# Patient Record
Sex: FEMALE | Race: WHITE | NOT HISPANIC OR LATINO | Employment: UNEMPLOYED | URBAN - NONMETROPOLITAN AREA
[De-identification: names, ages, dates, MRNs, and addresses within clinical notes are randomized per-mention and may not be internally consistent; named-entity substitution may affect disease eponyms.]

---

## 2024-08-15 ENCOUNTER — OFFICE VISIT (OUTPATIENT)
Dept: CARDIOLOGY | Facility: CLINIC | Age: 34
End: 2024-08-15
Payer: COMMERCIAL

## 2024-08-15 VITALS
SYSTOLIC BLOOD PRESSURE: 112 MMHG | OXYGEN SATURATION: 97 % | BODY MASS INDEX: 25.48 KG/M2 | WEIGHT: 143.8 LBS | HEART RATE: 62 BPM | DIASTOLIC BLOOD PRESSURE: 78 MMHG | HEIGHT: 63 IN

## 2024-08-15 DIAGNOSIS — R07.2 PRECORDIAL PAIN: ICD-10-CM

## 2024-08-15 DIAGNOSIS — R06.09 DYSPNEA ON EXERTION: ICD-10-CM

## 2024-08-15 DIAGNOSIS — R55 SYNCOPE AND COLLAPSE: Primary | ICD-10-CM

## 2024-08-15 DIAGNOSIS — R00.2 PALPITATIONS: ICD-10-CM

## 2024-08-15 PROCEDURE — 99204 OFFICE O/P NEW MOD 45 MIN: CPT | Performed by: CLINICAL NURSE SPECIALIST

## 2024-08-15 RX ORDER — CITALOPRAM HYDROBROMIDE 10 MG/1
10 TABLET ORAL DAILY
COMMUNITY

## 2024-08-15 RX ORDER — BUSPIRONE HYDROCHLORIDE 10 MG/1
10 TABLET ORAL DAILY
COMMUNITY

## 2024-08-15 NOTE — PROGRESS NOTES
"Shruthi Berry is a 33 y.o. female who presents today for Syncope and Palpitations.    CHIEF COMPLIANT  Chief Complaint   Patient presents with    Syncope    Palpitations       Active Problems:  1.  Syncope  2.  Palpitations  3.  Chest pain  4.  Dyspnea  5.  Anxiety    HPI  Patient is a 33-year-old female that was referred for further cardiac evaluation due to recent episode of syncope and ongoing palpitations.  The patient states the symptoms have been going on for around a year since she was pregnant.  She states during her pregnancy her heart rate went up in the 170s.  She was also hypertensive when pregnant.  She was started on metoprolol tartrate due to elevated heart rate and has been on this for almost a year.  The patient states a couple months ago she started feeling a sensation in her chest.  She states her chest will get tight and she will feel a \"thump.\"  She states it feels like her heart is skipping beats.  She has also been having episodes of dizziness where she feels like she will blackout when she stands up.  Last Tuesday she was driving and her arm started feeling heavy and numb.  She was able to pull over on the side of the road and ended up passing out.  She was on the phone with her mother at the time this occurred who was able to get a hold of her .  He had her location and was able to send an ambulance.  The patient did have her children in the car with her.  She states she was in and out.  She did feel the palpitations during this event.  She was taken to the emergency department for evaluation.  Her labs were normal.  She was given IV fluids and her symptoms did improve.  The patient has been on semaglutide and has lost a total of 30 pounds.  The patient states she did have her shot a couple hours before the syncopal event.  She also states she took her metoprolol about an hour and a half before the syncopal event.  We did check orthostatic vital signs while in the office " and her blood pressure was negative for orthostatic hypotension.  The patient has had no further syncope since that event but has continued to have palpitations, dizziness and chest tightness.  The patient is not aware of any cardiac history in her family but does not know her father side of the family.  The patient does state she previously drank a lot of coffee but has cut this back to 1 cup/day.  She states she does not drink enough fluids.    PRIOR MEDS  Current Outpatient Medications on File Prior to Visit   Medication Sig Dispense Refill    busPIRone (BUSPAR) 10 MG tablet Take 1 tablet by mouth Daily. Pt states that she only takes when needed.      citalopram (CeleXA) 10 MG tablet Take 1 tablet by mouth Daily.      metoprolol tartrate (LOPRESSOR) 25 MG tablet Take 1 tablet by mouth 2 (Two) Times a Day.      SEMAGLUTIDE, 1 MG/DOSE, SC Inject 1 mg into the appropriate muscle as directed by prescriber 1 (One) Time Per Week.       No current facility-administered medications on file prior to visit.       ALLERGIES  Patient has no known allergies.    HISTORY  History reviewed. No pertinent past medical history.    Social History     Socioeconomic History    Marital status:    Tobacco Use    Smoking status: Never    Smokeless tobacco: Never   Vaping Use    Vaping status: Never Used   Substance and Sexual Activity    Alcohol use: Never    Drug use: Never       History reviewed. No pertinent family history.    Review of Systems   Constitutional:  Positive for chills and fatigue. Negative for fever.   HENT:  Positive for congestion, sinus pressure, sinus pain, sneezing and sore throat. Negative for ear pain.    Eyes:  Positive for pain, redness and itching.   Respiratory:  Positive for cough and wheezing. Negative for chest tightness and shortness of breath.    Cardiovascular:  Positive for leg swelling. Negative for chest pain and palpitations.   Gastrointestinal:  Negative for constipation and diarrhea.  "  Genitourinary: Negative.    Musculoskeletal:  Positive for back pain and neck stiffness. Negative for neck pain.   Allergic/Immunologic: Positive for environmental allergies and food allergies.   Neurological:  Negative for dizziness, syncope, weakness (Everyday) and light-headedness.   Psychiatric/Behavioral:  Negative for sleep disturbance (Off and on 5/6 hours a night).        Objective     VITALS: /78 (BP Location: Right arm, Patient Position: Standing)   Pulse 62   Ht 160 cm (63\")   Wt 65.2 kg (143 lb 12.8 oz)   SpO2 97%   BMI 25.47 kg/m²     LABS:   Lab Results (most recent)       None            IMAGING:   No Images in the past 120 days found..    EXAM:  Physical Exam  Constitutional:       Appearance: Normal appearance.   Eyes:      Pupils: Pupils are equal, round, and reactive to light.   Cardiovascular:      Rate and Rhythm: Normal rate and regular rhythm.      Pulses:           Carotid pulses are 2+ on the right side and 2+ on the left side.       Radial pulses are 2+ on the right side and 2+ on the left side.        Dorsalis pedis pulses are 2+ on the right side and 2+ on the left side.        Posterior tibial pulses are 2+ on the right side and 2+ on the left side.      Heart sounds: Normal heart sounds.   Pulmonary:      Effort: Pulmonary effort is normal.      Breath sounds: Normal breath sounds.   Abdominal:      General: Bowel sounds are normal.      Palpations: Abdomen is soft.   Musculoskeletal:      Right lower leg: No edema.      Left lower leg: No edema.   Skin:     General: Skin is warm and dry.      Capillary Refill: Capillary refill takes less than 2 seconds.   Neurological:      General: No focal deficit present.      Mental Status: She is alert and oriented to person, place, and time.   Psychiatric:         Mood and Affect: Mood normal.         Thought Content: Thought content normal.         Procedure   Procedures       Assessment & Plan    Diagnosis Plan   1. Syncope and " collapse  Adult Transthoracic Echo Complete W/ Cont if Necessary Per Protocol    Treadmill Stress Test    Mobile Cardiac Outpatient Telemetry    Duplex Carotid Ultrasound CAR      2. Precordial pain  Adult Transthoracic Echo Complete W/ Cont if Necessary Per Protocol    Treadmill Stress Test      3. Dyspnea on exertion  Adult Transthoracic Echo Complete W/ Cont if Necessary Per Protocol    Treadmill Stress Test      4. Palpitations  Adult Transthoracic Echo Complete W/ Cont if Necessary Per Protocol    Treadmill Stress Test    Mobile Cardiac Outpatient Telemetry        Plan:  1.  Syncope and collapse: We will schedule patient for us treadmill stress test for ischemic evaluation.  Will place a cardiac monitor to evaluate for possible dysrhythmia.  Will schedule patient for an echocardiogram to evaluate LV function and structural anatomy.  The patient's blood pressure is also on the low side today with her recent weight loss it is possible that she could have had a drop in her blood pressure.  She is having a lot of dizziness when standing up.  I have asked her to decrease her metoprolol dose to 12.5 mg p.o. twice daily to see if this makes any impact on her symptoms.  I have also asked her to increase noncaffeinated fluids to 80 to 100 ounces daily and to reduce caffeine intake.  If we are unable to determine an etiology for her syncope we may consider tilt table test.  Will discuss at her follow-up visit.  2.  Precordial pain: We will proceed with testing as described above.  3.  Dyspnea on exertion: Testing as described above.  4.  Palpitations: Will continue metoprolol but will reduce dose to 12.5 mg p.o. twice daily.  Will place the patient on a 30-day cardiac event monitor.      No follow-ups on file.    Katya Jamal  reports that she has never smoked. She has never used smokeless tobacco. I                      MEDS ORDERED DURING VISIT:  No orders of the defined types were placed in this  encounter.      DISCONTINUED MEDS DURING VISIT:   There are no discontinued medications.       This document has been electronically signed by ANAHI De Jesus  August 15, 2024 12:36 EDT    Dictated Utilizing Dragon Dictation: Part of this note may be an electronic transcription/translation of spoken language to printed text using the Dragon Dictation System

## 2024-09-17 ENCOUNTER — TELEPHONE (OUTPATIENT)
Dept: CARDIOLOGY | Facility: CLINIC | Age: 34
End: 2024-09-17
Payer: COMMERCIAL

## 2024-09-17 RX ORDER — METOPROLOL TARTRATE 37.5 MG/1
37.5 TABLET, FILM COATED ORAL 2 TIMES DAILY
Qty: 180 TABLET | Refills: 3 | Status: SHIPPED | OUTPATIENT
Start: 2024-09-17

## 2024-09-24 ENCOUNTER — HOSPITAL ENCOUNTER (OUTPATIENT)
Dept: CARDIOLOGY | Facility: HOSPITAL | Age: 34
Discharge: HOME | End: 2024-09-24
Payer: COMMERCIAL

## 2024-09-24 DIAGNOSIS — R07.2 PRECORDIAL PAIN: ICD-10-CM

## 2024-09-24 DIAGNOSIS — R06.09 DYSPNEA ON EXERTION: ICD-10-CM

## 2024-09-24 DIAGNOSIS — R55 SYNCOPE AND COLLAPSE: ICD-10-CM

## 2024-09-24 DIAGNOSIS — R00.2 PALPITATIONS: ICD-10-CM

## 2024-09-24 PROCEDURE — 93880 EXTRACRANIAL BILAT STUDY: CPT

## 2024-09-24 PROCEDURE — 93017 CV STRESS TEST TRACING ONLY: CPT

## 2024-09-24 PROCEDURE — 93306 TTE W/DOPPLER COMPLETE: CPT

## 2024-09-27 LAB
BH CV STRESS DURATION MIN STAGE 1: 3
BH CV STRESS DURATION SEC STAGE 1: 0
BH CV STRESS GRADE STAGE 1: 10
BH CV STRESS METS STAGE 1: 5
BH CV STRESS PROTOCOL 1: NORMAL
BH CV STRESS RECOVERY BP: NORMAL MMHG
BH CV STRESS RECOVERY HR: 102 BPM
BH CV STRESS SPEED STAGE 1: 1.7
BH CV STRESS STAGE 1: 1
MAXIMAL PREDICTED HEART RATE: 188 BPM
PERCENT MAX PREDICTED HR: 93.62 %
STRESS BASELINE BP: NORMAL MMHG
STRESS BASELINE HR: 71 BPM
STRESS PERCENT HR: 110 %
STRESS POST ESTIMATED WORKLOAD: 11.8 METS
STRESS POST EXERCISE DUR MIN: 9 MIN
STRESS POST EXERCISE DUR SEC: 37 SEC
STRESS POST PEAK BP: NORMAL MMHG
STRESS POST PEAK HR: 176 BPM
STRESS TARGET HR: 160 BPM

## 2024-09-29 LAB
BH CV ECHO MEAS - ACS: 2.2 CM
BH CV ECHO MEAS - AO MAX PG: 7.8 MMHG
BH CV ECHO MEAS - AO MEAN PG: 4.7 MMHG
BH CV ECHO MEAS - AO ROOT DIAM: 3.1 CM
BH CV ECHO MEAS - AO V2 MAX: 139.8 CM/SEC
BH CV ECHO MEAS - AO V2 VTI: 34.2 CM
BH CV ECHO MEAS - EDV(CUBED): 69.9 ML
BH CV ECHO MEAS - EDV(MOD-SP4): 72.3 ML
BH CV ECHO MEAS - EF(MOD-SP4): 57.1 %
BH CV ECHO MEAS - EF_3D-VOL: 61 %
BH CV ECHO MEAS - ESV(CUBED): 25.9 ML
BH CV ECHO MEAS - ESV(MOD-SP4): 31 ML
BH CV ECHO MEAS - FS: 28.2 %
BH CV ECHO MEAS - IVS/LVPW: 1.04 CM
BH CV ECHO MEAS - IVSD: 0.99 CM
BH CV ECHO MEAS - LA DIMENSION: 3.2 CM
BH CV ECHO MEAS - LAT PEAK E' VEL: 14.7 CM/SEC
BH CV ECHO MEAS - LV DIASTOLIC VOL/BSA (35-75): 43.1 CM2
BH CV ECHO MEAS - LV MASS(C)D: 127.6 GRAMS
BH CV ECHO MEAS - LV SYSTOLIC VOL/BSA (12-30): 18.5 CM2
BH CV ECHO MEAS - LVIDD: 4.1 CM
BH CV ECHO MEAS - LVIDS: 3 CM
BH CV ECHO MEAS - LVPWD: 0.95 CM
BH CV ECHO MEAS - MED PEAK E' VEL: 12.6 CM/SEC
BH CV ECHO MEAS - MV A MAX VEL: 66.4 CM/SEC
BH CV ECHO MEAS - MV DEC TIME: 0.22 SEC
BH CV ECHO MEAS - MV E MAX VEL: 91.3 CM/SEC
BH CV ECHO MEAS - MV E/A: 1.37
BH CV ECHO MEAS - RVDD: 3.1 CM
BH CV ECHO MEAS - SV(MOD-SP4): 41.3 ML
BH CV ECHO MEAS - SVI(MOD-SP4): 24.6 ML/M2
BH CV ECHO MEASUREMENTS AVERAGE E/E' RATIO: 6.69
BH CV XLRA MEAS LEFT CAROTID BULB EDV: -26.9 CM/SEC
BH CV XLRA MEAS LEFT CAROTID BULB PSV: -85.8 CM/SEC
BH CV XLRA MEAS LEFT DIST CCA EDV: -32.1 CM/SEC
BH CV XLRA MEAS LEFT DIST CCA PSV: -96.2 CM/SEC
BH CV XLRA MEAS LEFT DIST ICA EDV: -52.5 CM/SEC
BH CV XLRA MEAS LEFT DIST ICA PSV: -113 CM/SEC
BH CV XLRA MEAS LEFT ICA/CCA RATIO: 1.2
BH CV XLRA MEAS LEFT MID ICA EDV: -36.4 CM/SEC
BH CV XLRA MEAS LEFT MID ICA PSV: -87 CM/SEC
BH CV XLRA MEAS LEFT PROX CCA EDV: 45.9 CM/SEC
BH CV XLRA MEAS LEFT PROX CCA PSV: 116 CM/SEC
BH CV XLRA MEAS LEFT PROX ECA PSV: -80.6 CM/SEC
BH CV XLRA MEAS LEFT PROX ICA EDV: -36.4 CM/SEC
BH CV XLRA MEAS LEFT PROX ICA PSV: -95.8 CM/SEC
BH CV XLRA MEAS LEFT VERTEBRAL A EDV: 17.7 CM/SEC
BH CV XLRA MEAS LEFT VERTEBRAL A PSV: 55.5 CM/SEC
BH CV XLRA MEAS RIGHT CAROTID BULB EDV: -20.3 CM/SEC
BH CV XLRA MEAS RIGHT CAROTID BULB PSV: -84.8 CM/SEC
BH CV XLRA MEAS RIGHT DIST CCA EDV: -26.6 CM/SEC
BH CV XLRA MEAS RIGHT DIST CCA PSV: -105 CM/SEC
BH CV XLRA MEAS RIGHT DIST ICA EDV: -35.7 CM/SEC
BH CV XLRA MEAS RIGHT DIST ICA PSV: -73.5 CM/SEC
BH CV XLRA MEAS RIGHT ICA/CCA RATIO: 0.7
BH CV XLRA MEAS RIGHT MID ICA EDV: -30.4 CM/SEC
BH CV XLRA MEAS RIGHT MID ICA PSV: -73.3 CM/SEC
BH CV XLRA MEAS RIGHT PROX CCA EDV: 30.1 CM/SEC
BH CV XLRA MEAS RIGHT PROX CCA PSV: 118 CM/SEC
BH CV XLRA MEAS RIGHT PROX ECA PSV: -76.4 CM/SEC
BH CV XLRA MEAS RIGHT PROX ICA EDV: -23.1 CM/SEC
BH CV XLRA MEAS RIGHT PROX ICA PSV: -68.7 CM/SEC
BH CV XLRA MEAS RIGHT VERTEBRAL A EDV: 11 CM/SEC
BH CV XLRA MEAS RIGHT VERTEBRAL A PSV: 33.8 CM/SEC
LEFT ATRIUM VOLUME INDEX: 17.9 ML/M2

## 2024-09-30 ENCOUNTER — TELEPHONE (OUTPATIENT)
Dept: CARDIOLOGY | Facility: CLINIC | Age: 34
End: 2024-09-30
Payer: COMMERCIAL

## 2024-09-30 NOTE — TELEPHONE ENCOUNTER
STRESS/ECHO/US CAROTID  Pt notified of no acute findings. Provider will discuss results at f/u. Pt reminded of appt date and time.  ----- Message from Padmini PATEL sent at 9/30/2024  8:40 AM EDT -----    ----- Message -----  From: Margarita Lozano APRN  Sent: 9/30/2024   8:07 AM EDT  To: Mari Chino    EF 55-60%.  Keep follow up

## 2024-10-02 ENCOUNTER — OFFICE VISIT (OUTPATIENT)
Dept: CARDIOLOGY | Facility: CLINIC | Age: 34
End: 2024-10-02
Payer: COMMERCIAL

## 2024-10-02 VITALS
OXYGEN SATURATION: 96 % | DIASTOLIC BLOOD PRESSURE: 67 MMHG | BODY MASS INDEX: 25.48 KG/M2 | HEART RATE: 76 BPM | SYSTOLIC BLOOD PRESSURE: 103 MMHG | WEIGHT: 143.8 LBS | HEIGHT: 63 IN

## 2024-10-02 DIAGNOSIS — I65.22 LEFT CAROTID STENOSIS: Primary | ICD-10-CM

## 2024-10-02 DIAGNOSIS — R55 SYNCOPE AND COLLAPSE: ICD-10-CM

## 2024-10-02 DIAGNOSIS — R00.2 PALPITATIONS: ICD-10-CM

## 2024-10-02 DIAGNOSIS — I49.3 FREQUENT PVCS: Primary | ICD-10-CM

## 2024-10-02 PROCEDURE — 99214 OFFICE O/P EST MOD 30 MIN: CPT | Performed by: CLINICAL NURSE SPECIALIST

## 2024-10-02 RX ORDER — METOPROLOL TARTRATE 25 MG/1
25 TABLET, FILM COATED ORAL 2 TIMES DAILY
Qty: 60 TABLET | Refills: 11 | Status: SHIPPED | OUTPATIENT
Start: 2024-10-02

## 2024-10-02 RX ORDER — FLECAINIDE ACETATE 50 MG/1
50 TABLET ORAL 2 TIMES DAILY
Qty: 60 TABLET | Refills: 11 | Status: SHIPPED | OUTPATIENT
Start: 2024-10-02

## 2024-10-02 NOTE — PROGRESS NOTES
Subjective     Katya Berry is a 34 y.o. female who presents today for Follow-up (3mth).    CHIEF COMPLIANT  Chief Complaint   Patient presents with    Follow-up     3mth       Active Problems:  1.  Syncope  1.1 carotid Doppler 9/24/2024: No evidence of hemodynamically significant stenosis in either carotid system.  Antegrade flow in both vertebrals.16-49% left, less than 15% right   2.  Palpitations  2.1 cardiac monitor 8/15/2024: Comment underlying rhythm is normal sinus rhythm.  Episodes sinus tachycardia with rate up to 1:48 PM.  PVCs 5% burden.  Patient triggered symptoms of flutter/skipped beats associated with PVCs.  Trigeminy noted.  3.  Chest pain  3.1 treadmill stress test 9/24/2024: No EKG evidence of ischemia, no exercise-induced dysrhythmia.  4.  Dyspnea  4.1 echocardiogram 9/24/2024: EF 55 to 60%.  Normal LV diastolic function.  No significant valvular abnormalities.  5.  Anxiety       HPI  The patient is a 34-year-old female that returns for follow-up discuss recent testing.  The patient had a treadmill stress test on 9/24/2024 that showed no EKG evidence of ischemia and no exercise-induced dysrhythmia.  Echocardiogram showed an EF of 55 to 60%, normal LV diastolic function and no significant valvular abnormalities.  Carotid Doppler showed 16 to 49% stenosis on the left and less than 15 on the right.  The patient wore a cardiac monitor which did show a 5% PVC burden.  The patient did trigger symptoms of flutter/skipped beats/lightheadedness which were associated with PVCs.  There was trigeminy noted.  Since the patient's last visit she has continued to have palpitations, lightheadedness and near syncope.  She denies no further syncope since her last visit.    PRIOR MEDS  Current Outpatient Medications on File Prior to Visit   Medication Sig Dispense Refill    busPIRone (BUSPAR) 10 MG tablet Take 1 tablet by mouth Every Night.      citalopram (CeleXA) 10 MG tablet Take 1 tablet by mouth Daily.       "SEMAGLUTIDE, 1 MG/DOSE, SC Inject 1 mg into the appropriate muscle as directed by prescriber 1 (One) Time Per Week.      [DISCONTINUED] metoprolol tartrate 37.5 MG tablet Take 37.5 mg by mouth 2 (Two) Times a Day. 180 tablet 3     No current facility-administered medications on file prior to visit.       ALLERGIES  Patient has no known allergies.    HISTORY  History reviewed. No pertinent past medical history.    Social History     Socioeconomic History    Marital status:    Tobacco Use    Smoking status: Never    Smokeless tobacco: Never   Vaping Use    Vaping status: Never Used   Substance and Sexual Activity    Alcohol use: Never    Drug use: Never       History reviewed. No pertinent family history.    Review of Systems   HENT:  Negative for congestion, rhinorrhea and sore throat.    Respiratory:  Positive for shortness of breath. Negative for chest tightness.    Cardiovascular:  Positive for chest pain (Unchanged) and palpitations. Negative for leg swelling.   Gastrointestinal:  Positive for blood in stool (Clots from hemorrhoids) and constipation.   Genitourinary: Negative.    Neurological:  Positive for dizziness (Daily, usually upon standing) and headaches. Negative for syncope (None since last seen), weakness and numbness.   Hematological:  Does not bruise/bleed easily.   Psychiatric/Behavioral:  Positive for sleep disturbance.        Objective     VITALS: /67   Pulse 76   Ht 160 cm (63\")   Wt 65.2 kg (143 lb 12.8 oz)   SpO2 96%   BMI 25.47 kg/m²     LABS:   Lab Results (most recent)       None            IMAGING:   No Images in the past 120 days found..    EXAM:    Constitutional:       Appearance: Normal appearance.   Eyes:      Pupils: Pupils are equal, round, and reactive to light.   Cardiovascular:      Rate and Rhythm: Normal rate and regular rhythm.      Pulses:           Carotid pulses are 2+ on the right side and 2+ on the left side.       Radial pulses are 2+ on the right side " and 2+ on the left side.        Dorsalis pedis pulses are 2+ on the right side and 2+ on the left side.        Posterior tibial pulses are 2+ on the right side and 2+ on the left side.      Heart sounds: Normal heart sounds.   Pulmonary:      Effort: Pulmonary effort is normal.      Breath sounds: Normal breath sounds.   Abdominal:      General: Bowel sounds are normal.      Palpations: Abdomen is soft.   Musculoskeletal:      Right lower leg: No edema.      Left lower leg: No edema.   Skin:     General: Skin is warm and dry.      Capillary Refill: Capillary refill takes less than 2 seconds.   Neurological:      General: No focal deficit present.      Mental Status: She is alert and oriented to person, place, and time.   Psychiatric:         Mood and Affect: Mood normal.         Thought Content: Thought content normal.      Procedure   Procedures       Assessment & Plan    Diagnosis Plan   1. Frequent PVCs  metoprolol tartrate (LOPRESSOR) 25 MG tablet    flecainide (TAMBOCOR) 50 MG tablet      2. Palpitations        3. Syncope and collapse          Plan:  1.  Frequent PVCs: Monitor did show a PVC burden of 5%.  Patient's symptoms while wearing monitor did correlate with PVCs.  There were episodes of trigeminy.  Will continue metoprolol tartrate 25 mg p.o. twice daily.  Will start the patient on flecainide 50 mg twice daily.  I have asked the patient to start this medication on Friday and to come in Monday morning for an EKG.  2.  Palpitations: Plan as above.  3.  Syncope: The patient has had no further syncope.  She does continue to have dizziness/near syncope which does occur with palpitations and is also occurring with orthostatic changes.  I did encourage the patient to continue with increased fluid intake.  Will get her started on flecainide as discussed above and see how this impacts her symptoms.    Return in about 6 weeks (around 11/13/2024) for Nurse visit for EKG on Monday morning .    Katya Berry  reports  that she has never smoked. She has never used smokeless tobacco.       BMI is >= 25 and <30. (Overweight) The following options were offered after discussion;: referral to primary care           MEDS ORDERED DURING VISIT:  New Medications Ordered This Visit   Medications    metoprolol tartrate (LOPRESSOR) 25 MG tablet     Sig: Take 1 tablet by mouth 2 (Two) Times a Day.     Dispense:  60 tablet     Refill:  11    flecainide (TAMBOCOR) 50 MG tablet     Sig: Take 1 tablet by mouth 2 (Two) Times a Day.     Dispense:  60 tablet     Refill:  11       DISCONTINUED MEDS DURING VISIT:   Medications Discontinued During This Encounter   Medication Reason    metoprolol tartrate 37.5 MG tablet           This document has been electronically signed by ANAHI De Jesus  October 2, 2024 12:26 EDT    Dictated Utilizing Dragon Dictation: Part of this note may be an electronic transcription/translation of spoken language to printed text using the Dragon Dictation System

## 2024-10-07 ENCOUNTER — LAB (OUTPATIENT)
Dept: LAB | Facility: HOSPITAL | Age: 34
End: 2024-10-07
Payer: COMMERCIAL

## 2024-10-07 ENCOUNTER — CLINICAL SUPPORT (OUTPATIENT)
Dept: CARDIOLOGY | Facility: CLINIC | Age: 34
End: 2024-10-07
Payer: COMMERCIAL

## 2024-10-07 VITALS — OXYGEN SATURATION: 99 % | SYSTOLIC BLOOD PRESSURE: 108 MMHG | DIASTOLIC BLOOD PRESSURE: 73 MMHG | HEART RATE: 70 BPM

## 2024-10-07 DIAGNOSIS — I49.3 FREQUENT PVCS: Primary | ICD-10-CM

## 2024-10-07 DIAGNOSIS — I65.22 LEFT CAROTID STENOSIS: ICD-10-CM

## 2024-10-07 LAB
CHOLEST SERPL-MCNC: 151 MG/DL (ref 0–200)
HDLC SERPL-MCNC: 58 MG/DL (ref 40–60)
LDLC SERPL CALC-MCNC: 80 MG/DL (ref 0–100)
LDLC/HDLC SERPL: 1.39 {RATIO}
TRIGL SERPL-MCNC: 62 MG/DL (ref 0–150)
VLDLC SERPL-MCNC: 13 MG/DL (ref 5–40)

## 2024-10-07 PROCEDURE — 80061 LIPID PANEL: CPT

## 2024-10-07 PROCEDURE — 36415 COLL VENOUS BLD VENIPUNCTURE: CPT

## 2024-10-07 PROCEDURE — 93000 ELECTROCARDIOGRAM COMPLETE: CPT | Performed by: CLINICAL NURSE SPECIALIST

## 2024-10-07 NOTE — PROGRESS NOTES
Katya Martenn  1990  10/7/2024   ?   Chief Complaint   Patient presents with    Nurse Visit      Cardiac management for Frequent PVCs , patient was started on Flecainide 50 mg Bid      ?   HPI:   ?   ? patient is in the office today for a NV with EKG , she was started on Flecainide 50 mg BID to help with her PVCs, she states today she has not taken any medications this morning due to having fasting lab work done. But since being on the Flecainide she has noticed a decrease in PVCs , she states no issues such as headache , upset stomach or dizziness. She stated she is still taking the metoprolol as prescribed.    ?     Current Outpatient Medications:     busPIRone (BUSPAR) 10 MG tablet, Take 1 tablet by mouth Every Night., Disp: , Rfl:     citalopram (CeleXA) 10 MG tablet, Take 1 tablet by mouth Daily., Disp: , Rfl:     flecainide (TAMBOCOR) 50 MG tablet, Take 1 tablet by mouth 2 (Two) Times a Day., Disp: 60 tablet, Rfl: 11    metoprolol tartrate (LOPRESSOR) 25 MG tablet, Take 1 tablet by mouth 2 (Two) Times a Day., Disp: 60 tablet, Rfl: 11    SEMAGLUTIDE, 1 MG/DOSE, SC, Inject 1 mg into the appropriate muscle as directed by prescriber 1 (One) Time Per Week., Disp: , Rfl:    ?   ?   Patient has no known allergies.         ECG 12 Lead    Date/Time: 10/7/2024 8:47 AM  Performed by: Margarita Lozano APRN    Authorized by: Margarita Lozano APRN  Comparison: compared with previous ECG from 8/6/2024           ?   Assessment & Plan    ?   Frequent PVCs      EKG. Chart, Medication reviewed by Margarita Lozano NP     EKG acceptable today.  Patient is to continue with current medication plan.  Patient to keep routine follow up.  Patient to call with any concerns or questions.    Patient verbalized understanding   AT - [facility]   ?

## 2024-10-11 ENCOUNTER — TELEPHONE (OUTPATIENT)
Dept: CARDIOLOGY | Facility: CLINIC | Age: 34
End: 2024-10-11
Payer: COMMERCIAL

## 2024-10-11 RX ORDER — ASPIRIN 81 MG/1
81 TABLET ORAL DAILY
Qty: 90 TABLET | Refills: 3 | Status: SHIPPED | OUTPATIENT
Start: 2024-10-11

## 2024-10-11 NOTE — TELEPHONE ENCOUNTER
"Called pt and informed her of message, she asked why she has \"build up\" if labs were normal. I explained that sometimes family history can play a role in that and that ASA 81mg daily is recommended, she verbalized understanding.   "

## 2024-10-11 NOTE — TELEPHONE ENCOUNTER
----- Message from Margarita Lozano sent at 10/9/2024  6:19 PM EDT -----  Yes  ----- Message -----  From: Lucía John MA  Sent: 10/8/2024  12:41 PM EDT  To: ANAHI Bullock    Do you still want her to continue with the baby aspirin daily ?  ----- Message -----  From: Padmini William RegSched Rep  Sent: 10/8/2024  11:58 AM EDT  To: Lucía John MA      ----- Message -----  From: Margarita Lozano APRN  Sent: 10/7/2024   5:03 PM EDT  To: Mari Chino    Overall cholesterol 151, Triglycerides 62.  HDL 58, LDL 80.

## 2024-11-19 ENCOUNTER — OFFICE VISIT (OUTPATIENT)
Dept: CARDIOLOGY | Facility: CLINIC | Age: 34
End: 2024-11-19
Payer: COMMERCIAL

## 2024-11-19 VITALS
DIASTOLIC BLOOD PRESSURE: 64 MMHG | SYSTOLIC BLOOD PRESSURE: 99 MMHG | HEIGHT: 63 IN | WEIGHT: 142.6 LBS | HEART RATE: 88 BPM | OXYGEN SATURATION: 96 % | BODY MASS INDEX: 25.27 KG/M2

## 2024-11-19 DIAGNOSIS — I95.1 AUTONOMIC ORTHOSTATIC HYPOTENSION: ICD-10-CM

## 2024-11-19 DIAGNOSIS — R00.2 PALPITATIONS: ICD-10-CM

## 2024-11-19 DIAGNOSIS — I49.3 FREQUENT PVCS: Primary | ICD-10-CM

## 2024-11-19 DIAGNOSIS — R55 SYNCOPE AND COLLAPSE: ICD-10-CM

## 2024-11-19 PROCEDURE — 99214 OFFICE O/P EST MOD 30 MIN: CPT | Performed by: CLINICAL NURSE SPECIALIST

## 2024-11-19 RX ORDER — MIDODRINE HYDROCHLORIDE 2.5 MG/1
2.5 TABLET ORAL
Qty: 90 TABLET | Refills: 3 | Status: SHIPPED | OUTPATIENT
Start: 2024-11-19

## 2024-11-19 NOTE — PROGRESS NOTES
Subjective     Katya Berry is a 34 y.o. female who presents today for Follow-up (6wk).    CHIEF COMPLIANT  Chief Complaint   Patient presents with    Follow-up     6wk       Active Problems:  1.  Syncope  1.1 carotid Doppler 9/24/2024: No evidence of hemodynamically significant stenosis in either carotid system.  Antegrade flow in both vertebrals.16-49% left, less than 15% right   2.  Palpitations  2.1 cardiac monitor 8/15/2024: Comment underlying rhythm is normal sinus rhythm.  Episodes sinus tachycardia with rate up to 1:48 PM.  PVCs 5% burden.  Patient triggered symptoms of flutter/skipped beats associated with PVCs.  Trigeminy noted.  3.  Chest pain  3.1 treadmill stress test 9/24/2024: No EKG evidence of ischemia, no exercise-induced dysrhythmia.  4.  Dyspnea  4.1 echocardiogram 9/24/2024: EF 55 to 60%.  Normal LV diastolic function.  No significant valvular abnormalities.  5.  Anxiety    HPI  The patient is a 34-year-old female that returns for follow-up.  At her last visit she was started on flecainide along with metoprolol due to ongoing palpitation symptoms.  Cardiac monitor had showed around a 5% PVC burden and her symptoms did correlate with PVCs.  Since starting flecainide the patient states that her symptoms have nearly resolved.  Overall she is feeling much better from a palpitation standpoint.  She is still having significant orthostatic dizziness when she stands up and her blood pressure still remains low.  This is causing her a lot of fatigue.  She denies chest pain or dyspnea.  The only other complaint she has is that she feels she has had increased hair loss since she has been placed on flecainide.    PRIOR MEDS  Current Outpatient Medications on File Prior to Visit   Medication Sig Dispense Refill    aspirin 81 MG EC tablet Take 1 tablet by mouth Daily. 90 tablet 3    busPIRone (BUSPAR) 10 MG tablet Take 1 tablet by mouth Every Night.      citalopram (CeleXA) 10 MG tablet Take 1 tablet by mouth  "Daily.      flecainide (TAMBOCOR) 50 MG tablet Take 1 tablet by mouth 2 (Two) Times a Day. 60 tablet 11    metoprolol tartrate (LOPRESSOR) 25 MG tablet Take 1 tablet by mouth 2 (Two) Times a Day. 60 tablet 11    SEMAGLUTIDE, 1 MG/DOSE, SC Inject 1 mg into the appropriate muscle as directed by prescriber 1 (One) Time Per Week.       No current facility-administered medications on file prior to visit.       ALLERGIES  Patient has no known allergies.    HISTORY  History reviewed. No pertinent past medical history.    Social History     Socioeconomic History    Marital status:    Tobacco Use    Smoking status: Never    Smokeless tobacco: Never   Vaping Use    Vaping status: Never Used   Substance and Sexual Activity    Alcohol use: Never    Drug use: Never       History reviewed. No pertinent family history.    Review of Systems   HENT:  Positive for dental problem (Been on amoxicillin).    Respiratory:  Positive for shortness of breath. Negative for chest tightness.    Cardiovascular:  Negative for chest pain, palpitations (None, unless she forgets her medicine) and leg swelling.   Neurological:  Positive for dizziness (w/ position change). Negative for syncope, weakness, numbness and headaches.   Hematological:  Does not bruise/bleed easily.   Psychiatric/Behavioral:  Positive for sleep disturbance (Denies soB at HS, just states she's not a good sleeper).        Objective     VITALS: BP 99/64   Pulse 88   Ht 160 cm (63\")   Wt 64.7 kg (142 lb 9.6 oz)   SpO2 96%   BMI 25.26 kg/m²     LABS:   Lab Results (most recent)       None            IMAGING:   No Images in the past 120 days found..    EXAM:  Constitutional:       Appearance: Normal appearance.   Eyes:      Pupils: Pupils are equal, round, and reactive to light.   Cardiovascular:      Rate and Rhythm: Normal rate and regular rhythm.      Pulses:           Carotid pulses are 2+ on the right side and 2+ on the left side.       Radial pulses are 2+ on the " right side and 2+ on the left side.        Dorsalis pedis pulses are 2+ on the right side and 2+ on the left side.        Posterior tibial pulses are 2+ on the right side and 2+ on the left side.      Heart sounds: Normal heart sounds.   Pulmonary:      Effort: Pulmonary effort is normal.      Breath sounds: Normal breath sounds.   Abdominal:      General: Bowel sounds are normal.      Palpations: Abdomen is soft.   Musculoskeletal:      Right lower leg: No edema.      Left lower leg: No edema.   Skin:     General: Skin is warm and dry.      Capillary Refill: Capillary refill takes less than 2 seconds.   Neurological:      General: No focal deficit present.      Mental Status: She is alert and oriented to person, place, and time.   Psychiatric:         Mood and Affect: Mood normal.         Thought Content: Thought content normal.   Procedure   Procedures       Assessment & Plan    Diagnosis Plan   1. Frequent PVCs        2. Palpitations        3. Autonomic orthostatic hypotension  midodrine (PROAMATINE) 2.5 MG tablet      4. Syncope and collapse          Plan:  1.  Frequent PVCs: Will continue flecainide and beta-blocker.  Since starting these medications the patient's palpitation symptoms have nearly resolved.  She is concerned about hair loss since starting flecainide.  We did discuss if this continued we could potentially try a different medication but the patient states she feels so much better from a cardiac standpoint that she does not want to change medications at this time.  She was advised to let us know if palpitation symptoms return or worsen.  2.  Palpitations: Plan as above.  3.  Orthostatic hypotension: Will add midodrine 2.5 mg p.o. 3 times daily due to ongoing hypotension.  Will see the patient back to evaluate effectiveness.  4.  Syncope: The patient has had no further syncope.  She does continue to have dizziness/near syncope.  Will add midodrine as discussed above.  I did encourage the patient to  continue with increased fluid intake.       Return in about 2 months (around 1/19/2025).    Katya Berry  reports that she has never smoked. She has never used smokeless tobacco.             MEDS ORDERED DURING VISIT:  New Medications Ordered This Visit   Medications    midodrine (PROAMATINE) 2.5 MG tablet     Sig: Take 1 tablet by mouth 3 (Three) Times a Day Before Meals.     Dispense:  90 tablet     Refill:  3       DISCONTINUED MEDS DURING VISIT:   There are no discontinued medications.       This document has been electronically signed by ANAHI De Jesus  November 20, 2024 07:53 EST    Dictated Utilizing Dragon Dictation: Part of this note may be an electronic transcription/translation of spoken language to printed text using the Dragon Dictation System

## 2024-12-03 ENCOUNTER — TELEPHONE (OUTPATIENT)
Dept: CARDIOLOGY | Facility: CLINIC | Age: 34
End: 2024-12-03
Payer: COMMERCIAL

## 2024-12-03 DIAGNOSIS — I49.3 FREQUENT PVCS: ICD-10-CM

## 2024-12-03 NOTE — TELEPHONE ENCOUNTER
"After Judaism Sunday morning, pt reported since starting the Flecainide she's experiencing an immense amount of hair loss.  Stated it is \"clumps\" of hair and she is concerned and has an appt with dermatology in February.  Stated she is afraid she will be bald by then and inquired if this is a common side effect with this medication.  She stated she had her thyroid checked and it is normal.    Discussed with Dr. Ayala why addressing a couple other things with him.  He researched it and made the recommendations below.    Pt to D/C Flecainide, after 5 days (12/9/24 am) start Sotalol 40 mg BID, EKG after 5th dose, and reduce Metoprolol to 12.5 mg BID once she starts Sotalol.    Pt notified of these recommendation and verbalized an understanding.  Verified pharmacy.    "

## 2024-12-04 RX ORDER — METOPROLOL TARTRATE 25 MG/1
25 TABLET, FILM COATED ORAL 2 TIMES DAILY
Qty: 60 TABLET | Refills: 11 | Status: SHIPPED | OUTPATIENT
Start: 2024-12-04

## 2024-12-11 ENCOUNTER — CLINICAL SUPPORT (OUTPATIENT)
Dept: CARDIOLOGY | Facility: CLINIC | Age: 34
End: 2024-12-11
Payer: COMMERCIAL

## 2024-12-11 VITALS — HEART RATE: 60 BPM | SYSTOLIC BLOOD PRESSURE: 106 MMHG | DIASTOLIC BLOOD PRESSURE: 66 MMHG

## 2024-12-11 DIAGNOSIS — R00.2 PALPITATIONS: ICD-10-CM

## 2024-12-11 DIAGNOSIS — I49.3 FREQUENT PVCS: Primary | ICD-10-CM

## 2024-12-11 NOTE — PROGRESS NOTES
Katya Jamal  1990 12/11/2024   ?   Chief Complaint   Patient presents with    Palpitations     EKG, Sotalol therapy      ?   HPI:   ?   ? Patient presents as nurse visit for EKG after starting Sotalol 40 mg BID on Monday. Flecainide was discontinued 5 days earlier due to hair loss. She does not feel the Sotalol is working as well as Flecainide but better than it was without it. EKG done as ordered and to be reviewed by Margarita CHRISTIAN   ?     Current Outpatient Medications:     aspirin 81 MG EC tablet, Take 1 tablet by mouth Daily., Disp: 90 tablet, Rfl: 3    busPIRone (BUSPAR) 10 MG tablet, Take 1 tablet by mouth Every Night., Disp: , Rfl:     citalopram (CeleXA) 10 MG tablet, Take 1 tablet by mouth Daily., Disp: , Rfl:     metoprolol tartrate (LOPRESSOR) 25 MG tablet, Take 1 tablet by mouth 2 (Two) Times a Day. Pt to take 1/2 BID once she starts Sotalol, Disp: 60 tablet, Rfl: 11    midodrine (PROAMATINE) 2.5 MG tablet, Take 1 tablet by mouth 3 (Three) Times a Day Before Meals., Disp: 90 tablet, Rfl: 3    SEMAGLUTIDE, 1 MG/DOSE, SC, Inject 1 mg into the appropriate muscle as directed by prescriber 1 (One) Time Per Week., Disp: , Rfl:     Sotalol HCl AF 80 MG tablet, Take 0.5 tablets by mouth 2 (Two) Times a Day., Disp: 30 tablet, Rfl: 1   ?   ?   Patient has no known allergies.         ECG 12 Lead    Date/Time: 12/11/2024 10:14 AM  Performed by: Margarita Lozano APRN    Authorized by: Margarita Lozano APRN  Comparison: compared with previous ECG from 10/7/2024           ?   Assessment & Plan    ?   ?   ?  1. Palpitations    EKG, vitals and symptoms reviewed by Margarita CHRISTIAN with order to increase Sotalol to 80 mg BID, EKG in 3 days. She will start increase today with EKG Friday and call with any concerns.

## 2024-12-13 ENCOUNTER — CLINICAL SUPPORT (OUTPATIENT)
Dept: CARDIOLOGY | Facility: CLINIC | Age: 34
End: 2024-12-13
Payer: COMMERCIAL

## 2024-12-13 VITALS
OXYGEN SATURATION: 96 % | HEART RATE: 56 BPM | WEIGHT: 151.6 LBS | BODY MASS INDEX: 26.86 KG/M2 | SYSTOLIC BLOOD PRESSURE: 98 MMHG | DIASTOLIC BLOOD PRESSURE: 48 MMHG | HEIGHT: 63 IN

## 2024-12-13 DIAGNOSIS — R00.2 PALPITATIONS: Primary | ICD-10-CM

## 2024-12-13 NOTE — PROGRESS NOTES
GI CONSULT NOTE    REQUESTING PROVIDER:  Andrew Kunz MD    CHIEF COMPLAINT:  Abdominal Pain (All over )       SUBJECTIVE:    Lor Perales is a 62 year old female seen today at the request of Andrew Kunz MD for abdominal pain. She was last seen in 2017. She has lower abdominal pain-bilateral, ongoing for the past 3 years.  No change in nature of the pain or severity in the past three years. It is intermittent, worse after eating, better with defecation. Some constipation-about 3 days/week. Stool is hard, + straining. Takes a chocolate flavored laxative which helps.    Imaging-x ray with moderate stool in her colon    She takes omeprazole 40 mg po daily-helps epigastric pain (that she had at the time of her last visit)    Says she has seen gyne and was told that her symptoms were not gynecologic in nature    PMH includes DM, h/o DVT     Colonoscopy 2017 3 mm hyperplastic polyp, hemorrhoids, normal TI  EGD 2017-h pylori neg, duodenal bx unremarkable    She speaks Spanish      PROBLEM LIST:                  Patient Active Problem List   Diagnosis   • Abdominal pain   • Multiple thyroid nodules   • Allergic rhinitis   • Anemia   • Arthralgia of multiple sites   • Chronic cough   • GERD (gastroesophageal reflux disease)   • Hyperlipidemia   • Renal mass   • Oral herpes   • Type 2 diabetes mellitus (CMS/HCC)   • Dyspepsia   • Irritable bowel syndrome with constipation       HISTORIES:    ALLERGIES:  No Known Allergies  Past Medical History:   Diagnosis Date   • Abdominal pain    • Bilateral shoulder pain    • Colon cancer screening    • Cough    • Deep venous thrombosis (CMS/HCC)    • Headache    • Hypotension    • Neck pain    • Otitis externa    • Spontaneous ecchymoses    • Vitamin B12 deficiency      Past Surgical History:   Procedure Laterality Date   • Colonoscopy     • Esophagogastroduodenoscopy     • Upper gastrointestinal endoscopy       Social History     Tobacco Use   • Smoking status: Never Smoker  Katya Martenn  1990 12/13/2024   ?   No chief complaint on file.     ?   HPI:   ?   Patient presents for EKG post Sotalol increase to 80 mg twice daily on 12/11/24. Patient feels palpitations may have improved some. She feels palpitations more at night, she has also had some migraines. Patient has not taken any midodrine yet today. She took TWO doses yesterday. She is concerned with having to take with food. EKG done as ordered.    Current Outpatient Medications:     aspirin 81 MG EC tablet, Take 1 tablet by mouth Daily., Disp: 90 tablet, Rfl: 3    busPIRone (BUSPAR) 10 MG tablet, Take 1 tablet by mouth Every Night., Disp: , Rfl:     citalopram (CeleXA) 10 MG tablet, Take 1 tablet by mouth Daily., Disp: , Rfl:     metoprolol tartrate (LOPRESSOR) 25 MG tablet, Take 1 tablet by mouth 2 (Two) Times a Day. Pt to take 1/2 BID once she starts Sotalol, Disp: 60 tablet, Rfl: 11    midodrine (PROAMATINE) 2.5 MG tablet, Take 1 tablet by mouth 3 (Three) Times a Day Before Meals. (Patient not taking: Reported on 12/11/2024), Disp: 90 tablet, Rfl: 3    SEMAGLUTIDE, 1 MG/DOSE, SC, Inject 1 mg into the appropriate muscle as directed by prescriber 1 (One) Time Per Week., Disp: , Rfl:     Sotalol HCl AF 80 MG tablet, Take 0.5 tablets by mouth 2 (Two) Times a Day., Disp: 30 tablet, Rfl: 1   ?   ?   Patient has no known allergies.         ECG 12 Lead    Date/Time: 12/13/2024 11:32 AM  Performed by: Margarita Lozano APRN    Authorized by: Margarita Lozano APRN  Comparison: compared with previous ECG from 12/11/2024         ?   Assessment & Plan    ?   ? 1. Palpitations    EKG and vitals reviewed by Margarita Lozano, no new orders at this time. Patient aware to take midodrine without food and see how she reacts to it. She will call with any concerns and keep her follow up in January.  ?          • Smokeless tobacco: Never Used   Substance Use Topics   • Alcohol use: No     Frequency: Never     Drug Use:    Never            No family history on file.     MEDICATIONS:       Medications    Medication Directions   Blood Glucose Monitoring Suppl (ONE TOUCH ULTRA 2) w/Device Kit USE AS DIRECTED   metFORMIN (GLUCOPHAGE) 500 MG tablet 1 TAKE 1 TABLET BY MOUTH TWICE DAILY   atorvastatin (LIPITOR) 80 MG tablet 1 TAKE 1 TABLET BY MOUTH EVERY DAY   meloxicam (MOBIC) 15 MG tablet 1 TAKE 1 TABLET BY MOUTH EVERY DAY   ONE TOUCH ULTRA TEST test strip TEST THREE TIMES DAILY   MICROLET LANCETS Misc TEST THREE TIMES DAILY   omeprazole (PRILOSEC) 40 MG capsule Take 1 capsule by mouth daily.   Alcohol Swabs (ALCOHOL PADS) 70 % Pads TEST THREE TIMES DAILY   ferrous sulfate 325 (65 FE) MG tablet TAKE 1 TABLET DAILY   triamcinolone (KENALOG) 0.1 % dental paste Apply three times daily for ten days.   diphenhyd-lidocaine-nystatin (FIRST-BXN MOUTHWASH) suspension Swish and spit 1 tsp every three hours as needed.       REVIEW OF SYSTEMS:    All other systems are reviewed and are negative except as documented in the history of present illness.    PHYSICAL EXAM:    Vital Signs: Blood pressure 110/70, pulse 65, temperature 97.9 °F (36.6 °C), weight 68.5 kg (151 lb).  General: The patient is well developed, well nourished, in no acute distress, appears stated age.   Neurologic: Alert. Normal mood and affect, normal speech, no gross tremor.  Skin: Warm and dry, normal turgor.  Head: Normocephalic.  Eyes: Normal conjunctivae and sclerae.  Pupils equal, round, reactive to light.  Extraocular movements intact.  HEENT: Oropharynx clear, moist mucous membranes, external nose is normal to inspection.  Neck: Symmetric without swelling or tenderness.   Respiratory: Respiratory effort normal.   Cardiovascular: Regular rate and rhythm.  Extremities: No swelling or tenderness.  Gastrointestinal:  Soft and nontender.  Normal bowel sounds.  No  hepatomegaly or splenomegaly.       LABORATORY DATA:   Lab Services on 08/29/2019   Component Date Value Ref Range Status   • Specimen Description 08/29/2019 STOOL STOOL   Final   • CULTURE 08/29/2019 NEGATIVE FOR CAMPYLOBACTER BY EIA.   Final   • CULTURE 08/29/2019 NEGATIVE FOR SALMONELLA,SHIGELLA,AEROMONAS,AND PLESIOMONAS   Final   • CULTURE 08/29/2019 NEGATIVE FOR SHIGA TOXIN BY EIA.   Final   • REPORT STATUS 08/29/2019 08/31/2019 FINAL   Final   • Fecal Occult Bld Immunochem 08/29/2019 NEGATIVE  NEGATIVE Final   • STOOL FOR WBC'S 08/29/2019 NEGATIVE  NEGATIVE Final   • Specimen Description 08/29/2019 BOWEL CONTENTS STOOL   Final   • OVA/PARASITES 08/29/2019 NO OVA OR PARASITES DETECTED. (CRYPTOSPORIDIA, MICROSPORIDIA AND CYCLOSPORA NOT ROUTINELY TESTED FOR.)   Final   • REPORT STATUS 08/29/2019 08/30/2019 FINAL   Final   • C. DIFFICILE SOURCE 08/29/2019 STOOL   Final   • C. difficile Toxin PCR 08/29/2019 NOT DETECTED  NOT DETECTED Final    Comment: C. DIFFICILE TOXIN EIA TESTING NOT INDICATED. C. difficile infection is highly unlikely.  Submission of additional specimens within 7 days is not recommended  C. Difficile Toxin B Gene not detected by Nucleic Acid Amplification.     A single negative test for C. difficile means the likelihood that this organism is causing the diarrheal illness is extremely low. Therefore repeat testing is strongly discouraged. If a new diarrheal illness occurs more than 7 days after the prior   negative test, then sending a SINGLE repeat specimen may be indicated.     Office Visit on 08/14/2019   Component Date Value Ref Range Status   • GRP A STREP 08/14/2019 Negative  Negative Final   • Internal Procedural Controls Accep* 08/14/2019 Yes   Final            ASSESSMENT/PLAN:         Problem List Items Addressed This Visit        Digestive    Abdominal pain - Primary    GERD (gastroesophageal reflux disease)    Dyspepsia    Irritable bowel syndrome with constipation                63 yo  F with years of lower abdominal pain and constipation. Symptoms c/w IBS-C. Colonoscopy in 2017 was unremarkable and an x ray showed a moderate amount of stool -these symptoms preceeded the colonoscopy.  1. Miralax 17 g po daily, dose may need to be adjusted  2. Dicyclomine 10 mg po q 6h prn  3. Consider CT pending response to above      The patient indicated understanding of the diagnosis and agreed with the plan of care.     A copy of this note was sent to the referring provider. Thank you for involving me in the care of this patient.    Greater than 50% of the visit was spent counseling regarding above issues; total time spent  25 minutes.      Electronically signed by Dr. Jennifer Frankel

## 2025-01-23 ENCOUNTER — OFFICE VISIT (OUTPATIENT)
Dept: CARDIOLOGY | Facility: CLINIC | Age: 35
End: 2025-01-23
Payer: COMMERCIAL

## 2025-01-23 VITALS
SYSTOLIC BLOOD PRESSURE: 104 MMHG | HEART RATE: 75 BPM | DIASTOLIC BLOOD PRESSURE: 74 MMHG | WEIGHT: 158 LBS | HEIGHT: 63 IN | BODY MASS INDEX: 28 KG/M2 | OXYGEN SATURATION: 94 %

## 2025-01-23 DIAGNOSIS — I49.3 FREQUENT PVCS: Primary | ICD-10-CM

## 2025-01-23 DIAGNOSIS — R00.2 PALPITATIONS: ICD-10-CM

## 2025-01-23 DIAGNOSIS — I49.3 SYMPTOMATIC PVCS: ICD-10-CM

## 2025-01-23 PROCEDURE — 99214 OFFICE O/P EST MOD 30 MIN: CPT | Performed by: CLINICAL NURSE SPECIALIST

## 2025-01-23 NOTE — PROGRESS NOTES
"Shruthi Berry is a 34 y.o. female who presents today for Follow-up (2mth).    CHIEF COMPLIANT  Chief Complaint   Patient presents with    Follow-up     2mth       Active Problems:    1.  Syncope  1.1 carotid Doppler 9/24/2024: No evidence of hemodynamically significant stenosis in either carotid system.  Antegrade flow in both vertebrals.16-49% left, less than 15% right   2.  Palpitations  2.1 cardiac monitor 8/15/2024: Comment underlying rhythm is normal sinus rhythm.  Episodes sinus tachycardia with rate up to 1:48 PM.  PVCs 5% burden.  Patient triggered symptoms of flutter/skipped beats associated with PVCs.  Trigeminy noted.  3.  Chest pain  3.1 treadmill stress test 9/24/2024: No EKG evidence of ischemia, no exercise-induced dysrhythmia.  4.  Dyspnea  4.1 echocardiogram 9/24/2024: EF 55 to 60%.  Normal LV diastolic function.  No significant valvular abnormalities.  5.  Anxiety        HPI  The patient is a 34-year-old female that returns for follow-up.  She has palpitations and wore a cardiac monitor which did show a 5% PVC burden.  The patient's symptoms did correlate with PVCs.  The patient was initially started on a beta-blocker and symptoms persisted.  Flecainide was added and her symptoms completely resolved.  Unfortunately not long after starting flecainide she started having significant hair loss.  This continued to the point that she \"felt she was going bald.\"  Flecainide was stopped and she was then changed to sotalol 40 mg twice daily.  Her symptoms were not controlled and sotalol was increased to 80 mg twice daily.  The patient returns for follow-up today.  She states that her hair loss has stopped but that her PVCs have returned.  She does have fatigue and dyspnea.  She states they are not as bad as before but flecainide definitely controlled her symptoms better.  She denies syncope.  She will have chest tightness at times with PVCs depending on the number.    PRIOR MEDS  Current " Outpatient Medications on File Prior to Visit   Medication Sig Dispense Refill    aspirin 81 MG EC tablet Take 1 tablet by mouth Daily. 90 tablet 3    busPIRone (BUSPAR) 10 MG tablet Take 1 tablet by mouth Every Night.      citalopram (CeleXA) 10 MG tablet Take 1 tablet by mouth Daily.      metoprolol tartrate (LOPRESSOR) 25 MG tablet Take 1 tablet by mouth 2 (Two) Times a Day. Pt to take 1/2 BID once she starts Sotalol (Patient taking differently: Take 0.5 tablets by mouth 2 (Two) Times a Day.) 60 tablet 11    midodrine (PROAMATINE) 2.5 MG tablet Take 1 tablet by mouth 3 (Three) Times a Day Before Meals. (Patient taking differently: Take 1 tablet by mouth 3 (Three) Times a Day Before Meals. Takes PRN) 90 tablet 3    SEMAGLUTIDE, 1 MG/DOSE, SC Inject 1 mg into the appropriate muscle as directed by prescriber 1 (One) Time Per Week.      Sotalol HCl AF 80 MG tablet Take 0.5 tablets by mouth 2 (Two) Times a Day. (Patient taking differently: Take 1 tablet by mouth 2 (Two) Times a Day.) 30 tablet 1     No current facility-administered medications on file prior to visit.       ALLERGIES  Flecainide    HISTORY  History reviewed. No pertinent past medical history.    Social History     Socioeconomic History    Marital status:    Tobacco Use    Smoking status: Never    Smokeless tobacco: Never   Vaping Use    Vaping status: Never Used   Substance and Sexual Activity    Alcohol use: Never    Drug use: Never       History reviewed. No pertinent family history.    Review of Systems   Constitutional:  Positive for fatigue (states it's a little better than it has been).   Respiratory:  Positive for shortness of breath. Negative for chest tightness.    Cardiovascular:  Positive for chest pain (Mainly when palps occur) and palpitations (Worst at night time, but has improved w/ med changes). Negative for leg swelling.   Neurological:  Negative for dizziness, syncope, weakness, numbness and headaches.   Hematological:  Does  "not bruise/bleed easily.   Psychiatric/Behavioral:  Positive for sleep disturbance (Palps).        Objective     VITALS: /74   Pulse 75   Ht 160 cm (63\")   Wt 71.7 kg (158 lb)   SpO2 94%   BMI 27.99 kg/m²     LABS:   Lab Results (most recent)       None            IMAGING:   No Images in the past 120 days found..    EXAM:  Physical Exam  Constitutional:       Appearance: Normal appearance.   Eyes:      Pupils: Pupils are equal, round, and reactive to light.   Cardiovascular:      Rate and Rhythm: Normal rate and regular rhythm.      Pulses:           Carotid pulses are 2+ on the right side and 2+ on the left side.       Radial pulses are 2+ on the right side and 2+ on the left side.        Dorsalis pedis pulses are 2+ on the right side and 2+ on the left side.        Posterior tibial pulses are 2+ on the right side and 2+ on the left side.      Heart sounds: Normal heart sounds.   Pulmonary:      Effort: Pulmonary effort is normal.      Breath sounds: Normal breath sounds.   Abdominal:      General: Bowel sounds are normal.      Palpations: Abdomen is soft.   Musculoskeletal:      Right lower leg: No edema.      Left lower leg: No edema.   Skin:     General: Skin is warm and dry.      Capillary Refill: Capillary refill takes less than 2 seconds.   Neurological:      General: No focal deficit present.      Mental Status: She is alert and oriented to person, place, and time.   Psychiatric:         Mood and Affect: Mood normal.         Thought Content: Thought content normal.         Procedure   Procedures       Assessment & Plan    Diagnosis Plan   1. Frequent PVCs  Ambulatory Referral to Cardiac Electrophysiology      2. Palpitations  Ambulatory Referral to Cardiac Electrophysiology      3. Symptomatic PVCs  Ambulatory Referral to Cardiac Electrophysiology        Plan:  1.  The patient did not want to remain on flecainide due to significant hair loss after starting the medication.  She was changed to " sotalol by Dr. Ayala which initially was at 40 mg and then increased to 80 mg twice daily.  She states her symptoms are not as bad as before but the PVCs have definitely returned.  She is having daily palpitations again with associated dyspnea and fatigue.  The patient has 3 young children and her symptoms make it difficult for her to carry out her daily activities.  Monitor did show a 5% PVC burden.  At this time we will make referral to EP for further evaluation and treatment/possible ablation.  Long-term the patient prefers not to remain on medication.    Will continue sotalol for now.  I have asked the patient to notify our office if her symptoms become worse before she gets in with EP.  She verbalizes an understanding.  Return in about 3 months (around 4/23/2025).    Katya Berry  reports that she has never smoked. She has never used smokeless tobacco.          MEDS ORDERED DURING VISIT:  No orders of the defined types were placed in this encounter.      DISCONTINUED MEDS DURING VISIT:   There are no discontinued medications.       This document has been electronically signed by ANAHI De Jesus  January 23, 2025 12:38 EST    Dictated Utilizing Dragon Dictation: Part of this note may be an electronic transcription/translation of spoken language to printed text using the Dragon Dictation System

## 2025-01-27 ENCOUNTER — TELEPHONE (OUTPATIENT)
Dept: CARDIOLOGY | Facility: CLINIC | Age: 35
End: 2025-01-27
Payer: COMMERCIAL

## 2025-01-27 DIAGNOSIS — I49.3 FREQUENT PVCS: ICD-10-CM

## 2025-01-27 RX ORDER — SOTALOL HYDROCHLORIDE 80 MG/1
40 TABLET ORAL DAILY
Qty: 30 TABLET | Refills: 11 | Status: SHIPPED | OUTPATIENT
Start: 2025-01-27 | End: 2025-01-27 | Stop reason: SDUPTHER

## 2025-01-27 RX ORDER — SOTALOL HYDROCHLORIDE 80 MG/1
80 TABLET ORAL EVERY 12 HOURS SCHEDULED
Qty: 60 TABLET | Refills: 11 | Status: SHIPPED | OUTPATIENT
Start: 2025-01-27

## 2025-01-27 NOTE — TELEPHONE ENCOUNTER
Caller: Katya Berry    Relationship: Self    Best call back number: [phone]    What is the best time to reach you: ANY    Who are you requesting to speak with (clinical staff, provider,  specific staff member): CLINICAL    What was the call regarding: PATIENT CALLED BECAUSE SHE STATES SHE TAKES A FULL TABLET OF SOTALOL TWICE DAILY, BUT HER CHART STILL SHOWS SHE TAKES 1/2 A TABLET TWICE DAILY AND SHE DOESN'T WANT TO PICK IT UP IF SHE'S GOING TO RUN OUT AGAIN SO FAST. PLEASE RESEND THE PRESCRIPTION TO [email or web address] IN [place] OR CALL PT WITH ANY QUESTIONS. THANK YOU    Is it okay if the provider responds through [facility]: PLEASE CALL

## 2025-04-18 ENCOUNTER — OFFICE VISIT (OUTPATIENT)
Dept: CARDIOLOGY | Facility: CLINIC | Age: 35
End: 2025-04-18
Payer: COMMERCIAL

## 2025-04-18 VITALS
HEIGHT: 63 IN | HEART RATE: 85 BPM | BODY MASS INDEX: 27.46 KG/M2 | SYSTOLIC BLOOD PRESSURE: 120 MMHG | OXYGEN SATURATION: 98 % | DIASTOLIC BLOOD PRESSURE: 86 MMHG | WEIGHT: 155 LBS

## 2025-04-18 DIAGNOSIS — R00.2 PALPITATIONS: Chronic | ICD-10-CM

## 2025-04-18 DIAGNOSIS — I49.3 PVC'S (PREMATURE VENTRICULAR CONTRACTIONS): Primary | Chronic | ICD-10-CM

## 2025-04-18 NOTE — PROGRESS NOTES
Electrophysiology Clinic Consult     Katya Berry  [id number]  1990    Referring Provider: No ref. provider found   PCP: Yuliya Burns, APRN  [address] / [place] [id number]    Chief Complaint   Patient presents with    Frequent PVCs    Palpitations    Shortness of Breath     Problem List  Palpitations / PVCs   Monitor, 8/15/2024: Sinus rhythm, 5% PVC burden  Echo, 9/20/2024: EF 55-60%, no significant valvular abnormality  AAD: Flecainide (hair loss), sotalol  Syncope  Carotid duplex, 9/2024: No significant stenosis bilaterally  Chest pain  GXT, 9/24/2024: No evidence of ischemia  Anxiety    History of Present Illness  Katya Berry is a 34 y.o. female who presents to my electrophysiology clinic for evaluation of PVCs. Noted to have PVC 1 year ago. Syncopal episode about 6 months ago- she was driving at that time (had some prodromal symptoms). PAOLO showing 5% PVC burden. Previously on flecainide which stopped her PVC but started having hair loss. Now on sotalol but still having some PVC episodes.     Review of Systems   Constitutional:  Positive for fatigue. Negative for activity change and fever.   Respiratory:  Negative for chest tightness and shortness of breath.    Cardiovascular:  Positive for palpitations. Negative for chest pain and leg swelling.   Gastrointestinal:  Negative for constipation and diarrhea.   Genitourinary:  Negative for decreased urine volume and difficulty urinating.   Skin:  Negative for wound.   Neurological:  Positive for weakness. Negative for dizziness, syncope and light-headedness.   Psychiatric/Behavioral:  Negative for suicidal ideas.        Outpatient Medications Marked as Taking for the 4/18/25 encounter (Office Visit) with Alexei Nieves MD   Medication Sig Dispense Refill    busPIRone (BUSPAR) 10 MG tablet Take 1 tablet by mouth Every Night. (Patient taking differently: Take 1 tablet by mouth As Needed (panic attacks).)      citalopram (CeleXA) 10 MG tablet Take  "1 tablet by mouth Daily.      midodrine (PROAMATINE) 2.5 MG tablet Take 1 tablet by mouth 3 (Three) Times a Day Before Meals. (Patient taking differently: Take 1 tablet by mouth 3 (Three) Times a Day Before Meals. Takes PRN) 90 tablet 3    SEMAGLUTIDE, 1 MG/DOSE, SC Inject 1 mg into the appropriate muscle as directed by prescriber 1 (One) Time Per Week.      [DISCONTINUED] aspirin 81 MG EC tablet Take 1 tablet by mouth Daily. 90 tablet 3    [DISCONTINUED] metoprolol tartrate (LOPRESSOR) 25 MG tablet Take 1 tablet by mouth 2 (Two) Times a Day. Pt to take 1/2 BID once she starts Sotalol (Patient taking differently: Take 0.5 tablets by mouth 2 (Two) Times a Day.) 60 tablet 11    [DISCONTINUED] sotalol (BETAPACE AF) 80 MG tablet tablet Take 1 tablet by mouth Every 12 (Twelve) Hours. 60 tablet 11       Physical Exam  Vitals:    04/18/25 1209   BP: 120/86   BP Location: Right arm   Patient Position: Sitting   Pulse: 85   SpO2: 98%   Weight: 70.3 kg (155 lb)   Height: 160 cm (63\")     Body mass index is 27.46 kg/m².    Vitals and nursing note reviewed.   Constitutional:       Appearance: Healthy appearance.   HENT:      Head: Normocephalic and atraumatic.      Nose: Nose normal.   Neck:      Vascular: No JVD.   Pulmonary:      Effort: Pulmonary effort is normal.      Breath sounds: Normal breath sounds.   Cardiovascular:      PMI at left midclavicular line. Normal rate. Regular rhythm. Normal S1. Normal S2.       Murmurs: There is no murmur.      No gallop.    Edema:     Peripheral edema absent.   Skin:     General: Skin is warm and dry.   Neurological:      Mental Status: Oriented to person, place and time.   Psychiatric:         Behavior: Behavior normal.          Diagnostic Data    ECG 12 Lead    Date/Time: 4/22/2025 11:37 AM  Performed by: Alexei Nieves MD    Authorized by: Alexei Nieves MD  Rhythm: sinus rhythm and sinus arrhythmia  Rate: normal  QRS axis: normal    Clinical impression: normal ECG          No results " "found for: \"GLUCOSE\", \"CALCIUM\", \"NA\", \"K\", \"CO2\", \"CL\", \"BUN\", \"CREATININE\", \"EGFRIFAFRI\", \"EGFRIFNONA\", \"BCR\", \"ANIONGAP\"  No results found for: \"WBC\", \"HGB\", \"HCT\", \"MCV\", \"PLT\"  No results found for: \"INR\", \"PROTIME\"  No results found for: \"TSH\", \"S8ZWNPG\", \"A1TVNDA\", \"THYROIDAB\"      I personally viewed and interpreted the patient's EKG/Telemetry/lab data    Katya Berry  reports that she has never smoked. She has never used smokeless tobacco. I have educated her on the risk of diseases from using tobacco products such as cancer, COPD, and heart disease.       I spent 3  minutes counseling the patient.      ACP discussion was declined by the patient. Patient does not have an advance directive, declines further assistance.    Assessment and Plan   Diagnoses and all orders for this visit:    1. PVC's (premature ventricular contractions) (Primary)    2. Palpitations    Other orders  -     ECG 12 Lead        Palpitations / PVCs   -Monitor, 8/15/2024: Sinus rhythm, 5% PVC burden  -Echo, 9/20/2024: EF 55-60%, no significant valvular abnormality  -Normal GXT  -AAD: Flecainide (hair loss), currently on sotalol  -5% PVC burden is minimal and hard to localize during EPS; discussed some of her prior syncopal episodes might not be related to PVC.    - stop sotalol and metoprolol   - trial of propafenone (previously flecainide worked but stopped due to hair loss).    - follow up in 3 months      Follow Up  Return in about 3 months (around 7/18/2025).      Thank you for allowing me to participate in the care of your patient. Please to not hesitate to contact me with additional questions or concerns.            "

## 2025-04-21 ENCOUNTER — TELEPHONE (OUTPATIENT)
Dept: CARDIOLOGY | Facility: CLINIC | Age: 35
End: 2025-04-21
Payer: COMMERCIAL

## 2025-04-21 ENCOUNTER — OFFICE VISIT (OUTPATIENT)
Dept: CARDIOLOGY | Facility: CLINIC | Age: 35
End: 2025-04-21
Payer: COMMERCIAL

## 2025-04-21 VITALS
SYSTOLIC BLOOD PRESSURE: 116 MMHG | BODY MASS INDEX: 28.07 KG/M2 | OXYGEN SATURATION: 98 % | HEIGHT: 63 IN | HEART RATE: 95 BPM | WEIGHT: 158.4 LBS | DIASTOLIC BLOOD PRESSURE: 79 MMHG

## 2025-04-21 DIAGNOSIS — I49.3 SYMPTOMATIC PVCS: Primary | ICD-10-CM

## 2025-04-21 DIAGNOSIS — I65.22 LEFT CAROTID STENOSIS: ICD-10-CM

## 2025-04-21 DIAGNOSIS — R06.09 DYSPNEA ON EXERTION: ICD-10-CM

## 2025-04-21 DIAGNOSIS — R00.2 PALPITATIONS: ICD-10-CM

## 2025-04-21 PROCEDURE — 99214 OFFICE O/P EST MOD 30 MIN: CPT | Performed by: CLINICAL NURSE SPECIALIST

## 2025-04-21 RX ORDER — SOTALOL HYDROCHLORIDE 80 MG/1
80 TABLET ORAL DAILY
COMMUNITY

## 2025-04-21 NOTE — TELEPHONE ENCOUNTER
Patient was seen in clinic on 4/18/25. She states that she is supposed to start a new medication but it has not been called in to her pharmacy.     Which medication would you like her to start taking?

## 2025-04-21 NOTE — PROGRESS NOTES
Subjective     Katya Berry is a 34 y.o. female who presents today for Follow-up (Pt wants to discuss EP appt (EP note unsigned) ).    CHIEF COMPLIANT  Chief Complaint   Patient presents with    Follow-up     Pt wants to discuss EP appt (EP note unsigned)        Active Problems:  1.  Syncope  1.1 carotid Doppler 9/24/2024: No evidence of hemodynamically significant stenosis in either carotid system.  Antegrade flow in both vertebrals.16-49% left, less than 15% right   2.  Palpitations  2.1 cardiac monitor 8/15/2024: Comment underlying rhythm is normal sinus rhythm.  Episodes sinus tachycardia with rate up to 1:48 PM.  PVCs 5% burden.  Patient triggered symptoms of flutter/skipped beats associated with PVCs.  Trigeminy noted.  3.  Chest pain  3.1 treadmill stress test 9/24/2024: No EKG evidence of ischemia, no exercise-induced dysrhythmia.  4.  Dyspnea  4.1 echocardiogram 9/24/2024: EF 55 to 60%.  Normal LV diastolic function.  No significant valvular abnormalities.  5.  Anxiety       HPI  The patient is a 34-year-old female that returns for follow-up.  She states she was seen by Dr. Nieves on Friday.  She states he told her to stop sotalol and metoprolol and was going to send her in a new medication to start today but that this was never sent into her pharmacy.  The patient continues to have palpitation symptoms where her heart will flutter and skipped beats.  Previously when she wore a cardiac monitor the symptoms were associated with PVCs.  There were episodes of trigeminy noted which did correlate to the patient's symptoms.  She was placed on flecainide which did resolve symptoms but unfortunately the patient had significant hair loss with this medication.  She was then changed to sotalol but unfortunately this did not manage her symptoms well.  The patient had fatigue on medication and was referred to EP for further evaluation and treatment of this.  The patient states that she was not happy with her experience at  the EP office.  She states that she requires further services from EP in the future she would prefer a referral to another provider.  The patient does continue to have tightness in her chest when she experiences skipped beats.  She denies significant dyspnea.  She continues to have episodes of near syncope when she experiences palpitations.  She has had no further syncope since her last visit in our office.  She does remain on midodrine as her blood pressure has been on the low side.  The patient is concerned because she has been off both metoprolol and sotalol all weekend and does not have the new medication from  at her pharmacy.  She states she called their office today and was told that the doctor was off and they would have to call her back tomorrow.    PRIOR MEDS  Current Outpatient Medications on File Prior to Visit   Medication Sig Dispense Refill    busPIRone (BUSPAR) 10 MG tablet Take 1 tablet by mouth Every Night. (Patient taking differently: Take 1 tablet by mouth As Needed (panic attacks).)      citalopram (CeleXA) 10 MG tablet Take 1 tablet by mouth Daily.      MAGNESIUM PO Take  by mouth.      metoprolol tartrate (LOPRESSOR) 25 MG tablet Take 1 half tablet by mouth 2 (Two) Times a Day.      midodrine (PROAMATINE) 2.5 MG tablet Take 1 tablet by mouth 3 (Three) Times a Day Before Meals. (Patient taking differently: Take 1 tablet by mouth 3 (Three) Times a Day Before Meals. Takes PRN) 90 tablet 3    SEMAGLUTIDE, 1 MG/DOSE, SC Inject 1 mg into the appropriate muscle as directed by prescriber 1 (One) Time Per Week.      sotalol (BETAPACE) 80 MG tablet Take 1 tablet by mouth Daily. Taking at HS       No current facility-administered medications on file prior to visit.       ALLERGIES  Flecainide    HISTORY  History reviewed. No pertinent past medical history.    Social History     Socioeconomic History    Marital status:    Tobacco Use    Smoking status: Never    Smokeless tobacco: Never   Vaping  "Use    Vaping status: Never Used   Substance and Sexual Activity    Alcohol use: Never    Drug use: Never    Sexual activity: Yes     Partners: Male     Birth control/protection: Vasectomy       Family History   Problem Relation Age of Onset    No Known Problems Mother     No Known Problems Father        Review of Systems   Constitutional:  Negative for fatigue.   Respiratory:  Positive for chest tightness (Friday- thinks it was anxiety related) and shortness of breath (Can occur w/ normal daily activity or episodes of stress/anxiety).    Cardiovascular:  Positive for chest pain (Friday) and palpitations. Negative for leg swelling.        Pt states she saw EP and several meds were stopped: Sotalol, Metoprolol, ASA.   Stated the medicine she was supposed to start wasn't called in and she was told EP was out of office. States she was told the medicine starts w/ \"P.\"    Musculoskeletal:  Positive for myalgias (Legs).   Neurological:  Positive for dizziness, syncope (2-3 weeks ago. Previous episode was about 1yr ago.), numbness (Legs) and headaches.   Psychiatric/Behavioral:  The patient is nervous/anxious.        Objective     VITALS: /79   Pulse 95   Ht 160 cm (63\")   Wt 71.8 kg (158 lb 6.4 oz)   SpO2 98%   BMI 28.06 kg/m²     LABS:   Lab Results (most recent)       None            IMAGING:   No Images in the past 120 days found..    EXAM:  Physical Exam  Constitutional:       Appearance: Normal appearance.   Eyes:      Pupils: Pupils are equal, round, and reactive to light.   Cardiovascular:      Rate and Rhythm: Normal rate and regular rhythm.      Pulses:           Carotid pulses are 2+ on the right side and 2+ on the left side.       Radial pulses are 2+ on the right side and 2+ on the left side.        Dorsalis pedis pulses are 2+ on the right side and 2+ on the left side.        Posterior tibial pulses are 2+ on the right side and 2+ on the left side.      Heart sounds: Normal heart sounds. "   Pulmonary:      Effort: Pulmonary effort is normal.      Breath sounds: Normal breath sounds.   Abdominal:      General: Bowel sounds are normal.      Palpations: Abdomen is soft.   Musculoskeletal:      Right lower leg: No edema.      Left lower leg: No edema.   Skin:     General: Skin is warm and dry.      Capillary Refill: Capillary refill takes less than 2 seconds.   Neurological:      General: No focal deficit present.      Mental Status: She is alert and oriented to person, place, and time.   Psychiatric:         Mood and Affect: Mood normal.         Thought Content: Thought content normal.         Procedure   Procedures       Assessment & Plan    Diagnosis Plan   1. Symptomatic PVCs        2. Palpitations        3. Dyspnea on exertion        4. Left carotid stenosis             Plan:  1.  Symptomatic PVCs/Palpitations: The patient continues to c/o palpitations and skipped beats.  When she wore a cardiac monitor her symptoms did correlate to PVCs, trigeminy was noted with 5% ventricular ectopic burden.  The patient's symptoms were controlled with flecainide but unfortunately she could not stay on this medication due to significant hair loss . She was changed to sotalol by Dr. Ayala which initially was at 40 mg and then increased to 80 mg twice daily.  Her symptoms were not as well controlled on sotalol and she started having daily palpitations again with associated dyspnea and fatigue.  The patient has 3 young children and her symptoms make it difficult for her to carry out her daily activities.    At this time the patient has stopped both sotalol and metoprolol under the instruction of EP but unfortunately the medication she was supposed to start today was not sent to her pharmacy.  The clinic note from Friday in incomplete so I am not able to review the suggested plan of care.  The patient called their office today and was told she would receive a return call tomorrow.  We will also attempt to reach out.   The patient states she was told she would need an EKG after starting the new medication.    We will call the patient to discuss once we know the plan of care and can provide further instructions.    3. JASSO:  Symptoms correlate to palpitations.  Plan as above.    4. Left carotid stenosis:  No evidence of hemodynamically significant stenosis in either carotid system.  Antegrade flow in both vertebrals.16-49% stenosis on left, less than 15% right.  Continue aspirin.          No follow-ups on file.    Katya Berry  reports that she has never smoked. She has never used smokeless tobacco.     DO YOU VAPE? No.     MEDS ORDERED DURING VISIT:  No orders of the defined types were placed in this encounter.      DISCONTINUED MEDS DURING VISIT:   There are no discontinued medications.       This document has been electronically signed by ANAHI De Jesus  April 21, 2025 16:48 EDT    Dictated Utilizing Dragon Dictation: Part of this note may be an electronic transcription/translation of spoken language to printed text using the Dragon Dictation System

## 2025-04-22 ENCOUNTER — TELEPHONE (OUTPATIENT)
Dept: CARDIOLOGY | Facility: CLINIC | Age: 35
End: 2025-04-22
Payer: COMMERCIAL

## 2025-04-22 RX ORDER — PROPAFENONE HYDROCHLORIDE 150 MG/1
150 TABLET, COATED ORAL EVERY 8 HOURS
Qty: 90 TABLET | Refills: 0 | Status: SHIPPED | OUTPATIENT
Start: 2025-04-22 | End: 2025-05-22

## 2025-04-22 NOTE — TELEPHONE ENCOUNTER
She is starting Rythmol tomorrow per Dr. Nieves's recommendation.  I did speak to the patient.  She is going to come Friday morning around 8:00 for an EKG as we will need to check her QTc.

## 2025-04-22 NOTE — TELEPHONE ENCOUNTER
Patient was expecting a call directly from Margarita Lozano today to let her know what she should do. Dr. Nieves's office sent in a new rx for propafenone and she would like to know if she should take it or change to a different provider (other than Ezequ[facility]) for an ablation. Please call patient back. She would like to only talk to Margarita.

## 2025-04-23 NOTE — TELEPHONE ENCOUNTER
I tried to call the patient back to f/u but she did not answer. I left a message for her to call me back at the office.

## 2025-04-28 ENCOUNTER — CLINICAL SUPPORT (OUTPATIENT)
Dept: CARDIOLOGY | Facility: CLINIC | Age: 35
End: 2025-04-28
Payer: COMMERCIAL

## 2025-04-28 DIAGNOSIS — I49.3 PVC'S (PREMATURE VENTRICULAR CONTRACTIONS): Primary | ICD-10-CM

## 2025-04-28 DIAGNOSIS — R00.2 PALPITATIONS: ICD-10-CM

## 2025-04-28 NOTE — PROGRESS NOTES
Katya Jamal  1990 4/28/2025   ?   Chief Complaint   Patient presents with    Palpitations     Rythmmol therapy       ?   HPI:   ?   ? Patient presents for EKG after starting Rythmol 150 mg TID on Saturday 4/26 for palpitations/PVCs. She reports still having palpitations at night along with nausea, headache and metallic taste when drinking water since starting the medication. She has follow up scheduled on Friday, 5/2/25, and would like to give the medication a bit more time. She failed Flecainide due to hair loss and could not tolerate Sotalol. EKG done as ordered and to be reviewed by Margarita CHRISTIAN.   ?     Current Outpatient Medications:     busPIRone (BUSPAR) 10 MG tablet, Take 1 tablet by mouth Every Night. (Patient taking differently: Take 1 tablet by mouth As Needed (panic attacks).), Disp: , Rfl:     citalopram (CeleXA) 10 MG tablet, Take 1 tablet by mouth Daily., Disp: , Rfl:     MAGNESIUM PO, Take  by mouth., Disp: , Rfl:     metoprolol tartrate (LOPRESSOR) 25 MG tablet, Take 1 half tablet by mouth 2 (Two) Times a Day., Disp: , Rfl:     midodrine (PROAMATINE) 2.5 MG tablet, Take 1 tablet by mouth 3 (Three) Times a Day Before Meals. (Patient taking differently: Take 1 tablet by mouth 3 (Three) Times a Day Before Meals. Takes PRN), Disp: 90 tablet, Rfl: 3    propafenone (RYTHMOL) 150 MG tablet, Take 1 tablet by mouth Every 8 (Eight) Hours for 30 days., Disp: 90 tablet, Rfl: 0    SEMAGLUTIDE, 1 MG/DOSE, SC, Inject 1 mg into the appropriate muscle as directed by prescriber 1 (One) Time Per Week., Disp: , Rfl:     sotalol (BETAPACE) 80 MG tablet, Take 1 tablet by mouth Daily. Taking at HS, Disp: , Rfl:    ?   ?   Flecainide         ECG 12 Lead    Date/Time: 4/28/2025 10:09 AM  Performed by: Margarita Lozano APRN    Authorized by: Margarita Lozano APRN  Comparison: compared with previous ECG from 4/18/2025           ?   Assessment & Plan    ?   ?   ?  1. Palpitations    EKG reviewed by Margarita  Blake CHRISTIAN with no new orders. Patient will continue medication as prescribed and follow up as scheduled, calling with concerns.

## 2025-05-02 ENCOUNTER — OFFICE VISIT (OUTPATIENT)
Dept: CARDIOLOGY | Facility: CLINIC | Age: 35
End: 2025-05-02
Payer: COMMERCIAL

## 2025-05-02 VITALS
SYSTOLIC BLOOD PRESSURE: 113 MMHG | HEART RATE: 98 BPM | DIASTOLIC BLOOD PRESSURE: 76 MMHG | OXYGEN SATURATION: 100 % | WEIGHT: 155.2 LBS | BODY MASS INDEX: 27.5 KG/M2 | HEIGHT: 63 IN

## 2025-05-02 DIAGNOSIS — R00.2 PALPITATIONS: Primary | ICD-10-CM

## 2025-05-02 DIAGNOSIS — I49.3 SYMPTOMATIC PVCS: ICD-10-CM

## 2025-05-02 DIAGNOSIS — I65.22 LEFT CAROTID STENOSIS: ICD-10-CM

## 2025-05-02 PROCEDURE — 99214 OFFICE O/P EST MOD 30 MIN: CPT | Performed by: CLINICAL NURSE SPECIALIST

## 2025-05-02 RX ORDER — FLECAINIDE ACETATE 50 MG/1
25 TABLET ORAL 2 TIMES DAILY
Qty: 30 TABLET | Refills: 6 | Status: SHIPPED | OUTPATIENT
Start: 2025-05-02

## 2025-05-02 RX ORDER — PROPRANOLOL HYDROCHLORIDE 40 MG/1
40 TABLET ORAL 2 TIMES DAILY
Qty: 60 TABLET | Refills: 6 | Status: SHIPPED | OUTPATIENT
Start: 2025-05-02

## 2025-05-02 NOTE — PROGRESS NOTES
Subjective     Katya Berry is a 34 y.o. female who presents today for Follow-up.    CHIEF COMPLIANT  Chief Complaint   Patient presents with    Follow-up       Active Problems:  1.  Syncope  1.1 carotid Doppler 9/24/2024: No evidence of hemodynamically significant stenosis in either carotid system.  Antegrade flow in both vertebrals.16-49% left, less than 15% right   2.  Palpitations  2.1 cardiac monitor 8/15/2024: Comment underlying rhythm is normal sinus rhythm.  Episodes sinus tachycardia with rate up to 1:48 PM.  PVCs 5% burden.  Patient triggered symptoms of flutter/skipped beats associated with PVCs.  Trigeminy noted.  3.  Chest pain  3.1 treadmill stress test 9/24/2024: No EKG evidence of ischemia, no exercise-induced dysrhythmia.  4.  Dyspnea  4.1 echocardiogram 9/24/2024: EF 55 to 60%.  Normal LV diastolic function.  No significant valvular abnormalities.  5.  Anxiety       HPI  The patient is a 34 year old year female that returns for follow up.  She has had ongoing palpitations and has been very symptomatic with this.  We had her on flecainide but she had hair loss.  She was changed to sotalol but this did not control symptoms.   She was referred to EP who then changed her to propafenone.  Since starting this medication she has headaches, nausea and a terrible metallic taste in her mouth.  She does feel that palpitations are better but the side effects are so bad she does not want to stay on this medication.      PRIOR MEDS  Current Outpatient Medications on File Prior to Visit   Medication Sig Dispense Refill    busPIRone (BUSPAR) 10 MG tablet Take 1 tablet by mouth Every Night. (Patient taking differently: Take 1 tablet by mouth As Needed (panic attacks).)      citalopram (CeleXA) 10 MG tablet Take 1 tablet by mouth Daily.      MAGNESIUM PO Take  by mouth.      midodrine (PROAMATINE) 2.5 MG tablet Take 1 tablet by mouth 3 (Three) Times a Day Before Meals. (Patient taking differently: Take 1 tablet by  "mouth 3 (Three) Times a Day Before Meals. Takes PRN) 90 tablet 3    SEMAGLUTIDE, 1 MG/DOSE, SC Inject 1 mg into the appropriate muscle as directed by prescriber 1 (One) Time Per Week.      [DISCONTINUED] propafenone (RYTHMOL) 150 MG tablet Take 1 tablet by mouth Every 8 (Eight) Hours for 30 days. 90 tablet 0    [DISCONTINUED] sotalol (BETAPACE) 80 MG tablet Take 1 tablet by mouth Daily. Taking at HS      [DISCONTINUED] metoprolol tartrate (LOPRESSOR) 25 MG tablet Take 1 half tablet by mouth 2 (Two) Times a Day.       No current facility-administered medications on file prior to visit.       ALLERGIES  Flecainide    HISTORY  History reviewed. No pertinent past medical history.    Social History     Socioeconomic History    Marital status:    Tobacco Use    Smoking status: Never    Smokeless tobacco: Never   Vaping Use    Vaping status: Never Used   Substance and Sexual Activity    Alcohol use: Never    Drug use: Never    Sexual activity: Yes     Partners: Male     Birth control/protection: Vasectomy       Family History   Problem Relation Age of Onset    No Known Problems Mother     No Known Problems Father        Review of Systems   Constitutional:  Positive for fatigue (Unchanged).   Respiratory:  Positive for chest tightness (Occasionally) and shortness of breath.    Cardiovascular:  Positive for palpitations (Some). Negative for chest pain and leg swelling.   Neurological:  Positive for dizziness and headaches. Negative for syncope, weakness and numbness.   Hematological:  Does not bruise/bleed easily.   Psychiatric/Behavioral:  Positive for sleep disturbance.        Objective     VITALS: /76   Pulse 98   Ht 160 cm (63\")   Wt 70.4 kg (155 lb 3.2 oz)   SpO2 100%   BMI 27.49 kg/m²     LABS:   Lab Results (most recent)       None            IMAGING:   No Images in the past 120 days found..    EXAM:  Physical Exam  Constitutional:       Appearance: Normal appearance.   Eyes:      Pupils: Pupils are " equal, round, and reactive to light.   Cardiovascular:      Rate and Rhythm: Normal rate and regular rhythm.      Pulses:           Carotid pulses are 2+ on the right side and 2+ on the left side.       Radial pulses are 2+ on the right side and 2+ on the left side.        Dorsalis pedis pulses are 2+ on the right side and 2+ on the left side.        Posterior tibial pulses are 2+ on the right side and 2+ on the left side.      Heart sounds: Normal heart sounds.   Pulmonary:      Effort: Pulmonary effort is normal.      Breath sounds: Normal breath sounds.   Abdominal:      General: Bowel sounds are normal.      Palpations: Abdomen is soft.   Musculoskeletal:      Right lower leg: No edema.      Left lower leg: No edema.   Skin:     General: Skin is warm and dry.      Capillary Refill: Capillary refill takes less than 2 seconds.   Neurological:      General: No focal deficit present.      Mental Status: She is alert and oriented to person, place, and time.   Psychiatric:         Mood and Affect: Mood normal.         Thought Content: Thought content normal.         Procedure   Procedures       Assessment & Plan    Diagnosis Plan   1. Palpitations        2. Symptomatic PVCs        3. Left carotid stenosis           Palpitations/Symptomatic PVCs:  The patient would like to stop Rythmol due to side effects.  We discussed multiple options for medications and ultimately she felt the best when on flecainide.  This was stopped due to hair loss.  After discussion the patient is agreeable to go back on flecainide at half the dose she was on prior.  Will restart flecainide 25 mg p.o. twice daily.  Hopefully at a lower dose she will not have hair loss the same as before.  We discussed restarting metoprolol but this made her very fatigued.  Will place on propranolol 40 mg twice daily.  She will need to be off rhythmol for three days then start flecainide.  Will need to check an EKG after 6 doses. She will come back next Friday  for a nurse visit for an EKG.       Return in about 2 months (around 7/2/2025).    Katya Berry  reports that she has never smoked. She has never used smokeless tobacco.        MEDS ORDERED DURING VISIT:  New Medications Ordered This Visit   Medications    flecainide (TAMBOCOR) 50 MG tablet     Sig: Take 0.5 tablets by mouth 2 (Two) Times a Day.     Dispense:  30 tablet     Refill:  6    propranolol (INDERAL) 40 MG tablet     Sig: Take 1 tablet by mouth 2 (Two) Times a Day.     Dispense:  60 tablet     Refill:  6       DISCONTINUED MEDS DURING VISIT:   Medications Discontinued During This Encounter   Medication Reason    sotalol (BETAPACE) 80 MG tablet     propafenone (RYTHMOL) 150 MG tablet     metoprolol tartrate (LOPRESSOR) 25 MG tablet           This document has been electronically signed by ANAHI De Jesus  May 2, 2025 12:40 EDT    Dictated Utilizing Dragon Dictation: Part of this note may be an electronic transcription/translation of spoken language to printed text using the Dragon Dictation System

## 2025-05-09 ENCOUNTER — CLINICAL SUPPORT (OUTPATIENT)
Dept: CARDIOLOGY | Facility: CLINIC | Age: 35
End: 2025-05-09
Payer: COMMERCIAL

## 2025-05-09 VITALS
WEIGHT: 157 LBS | SYSTOLIC BLOOD PRESSURE: 127 MMHG | HEART RATE: 76 BPM | HEIGHT: 63 IN | BODY MASS INDEX: 27.82 KG/M2 | DIASTOLIC BLOOD PRESSURE: 86 MMHG

## 2025-05-09 DIAGNOSIS — I49.3 SYMPTOMATIC PVCS: Primary | ICD-10-CM

## 2025-05-09 RX ORDER — ASPIRIN 81 MG/1
81 TABLET ORAL DAILY
COMMUNITY

## 2025-05-09 NOTE — PROGRESS NOTES
Katya Berry  1990 5/9/2025   ?   Nurse visit  Flecainide          HPI: Patient advised she still has some palpitations that are more noticeable at night or laying down. She feels as if the medication has helped.  ?    Palpitations/Symptomatic PVCs:  The patient would like to stop Rythmol due to side effects.  We discussed multiple options for medications and ultimately she felt the best when on flecainide.  This was stopped due to hair loss.  After discussion the patient is agreeable to go back on flecainide at half the dose she was on prior.  Will restart flecainide 25 mg p.o. twice daily.  Hopefully at a lower dose she will not have hair loss the same as before.  We discussed restarting metoprolol but this made her very fatigued.  Will place on propranolol 40 mg twice daily.  She will need to be off rhythmol for three days then start flecainide.  Will need to check an EKG after 6 doses. She will come back next Friday for a nurse visit for an EKG.   ?   ?     Current Outpatient Medications:     busPIRone (BUSPAR) 10 MG tablet, Take 1 tablet by mouth Every Night. (Patient taking differently: Take 1 tablet by mouth As Needed (panic attacks).), Disp: , Rfl:     citalopram (CeleXA) 10 MG tablet, Take 1 tablet by mouth Daily., Disp: , Rfl:     flecainide (TAMBOCOR) 50 MG tablet, Take 0.5 tablets by mouth 2 (Two) Times a Day., Disp: 30 tablet, Rfl: 6    MAGNESIUM PO, Take  by mouth., Disp: , Rfl:     midodrine (PROAMATINE) 2.5 MG tablet, Take 1 tablet by mouth 3 (Three) Times a Day Before Meals. (Patient taking differently: Take 1 tablet by mouth 3 (Three) Times a Day Before Meals. Takes PRN), Disp: 90 tablet, Rfl: 3    propranolol (INDERAL) 40 MG tablet, Take 1 tablet by mouth 2 (Two) Times a Day., Disp: 60 tablet, Rfl: 6    SEMAGLUTIDE, 1 MG/DOSE, SC, Inject 1 mg into the appropriate muscle as directed by prescriber 1 (One) Time Per Week., Disp: , Rfl:    ?   ?   Flecainide         ECG 12 Lead    Date/Time:  5/9/2025 10:13 AM  Performed by: Margarita Lozano APRN    Authorized by: Margarita Lozano APRN  Comparison: compared with previous ECG            ?   Assessment & Plan  1.PVC  Per Margarita CHRISTIAN  Continue medications.  Keep follow up in July.  Contact office with any new or worsening symptoms in the meantime.  Patient verbally understands recommendations.    Patrica Sanchez MA    ?   ?   ?

## 2025-05-21 RX ORDER — PROPAFENONE HYDROCHLORIDE 150 MG/1
150 TABLET, COATED ORAL EVERY 8 HOURS
Qty: 90 TABLET | Refills: 0 | OUTPATIENT
Start: 2025-05-21

## 2025-07-11 ENCOUNTER — OFFICE VISIT (OUTPATIENT)
Dept: CARDIOLOGY | Facility: CLINIC | Age: 35
End: 2025-07-11
Payer: COMMERCIAL

## 2025-07-11 VITALS
SYSTOLIC BLOOD PRESSURE: 103 MMHG | HEART RATE: 93 BPM | OXYGEN SATURATION: 98 % | HEIGHT: 63 IN | BODY MASS INDEX: 27.29 KG/M2 | WEIGHT: 154 LBS | DIASTOLIC BLOOD PRESSURE: 73 MMHG

## 2025-07-11 DIAGNOSIS — R00.2 PALPITATIONS: Primary | ICD-10-CM

## 2025-07-11 DIAGNOSIS — I65.22 LEFT CAROTID STENOSIS: ICD-10-CM

## 2025-07-11 DIAGNOSIS — I49.3 PVC'S (PREMATURE VENTRICULAR CONTRACTIONS): ICD-10-CM

## 2025-07-11 PROCEDURE — 93000 ELECTROCARDIOGRAM COMPLETE: CPT | Performed by: CLINICAL NURSE SPECIALIST

## 2025-07-11 PROCEDURE — 99214 OFFICE O/P EST MOD 30 MIN: CPT | Performed by: CLINICAL NURSE SPECIALIST

## 2025-07-11 RX ORDER — METOPROLOL TARTRATE 25 MG/1
12.5 TABLET, FILM COATED ORAL 2 TIMES DAILY
Qty: 30 TABLET | Refills: 6 | Status: SHIPPED | OUTPATIENT
Start: 2025-07-11

## 2025-07-11 NOTE — PROGRESS NOTES
Subjective     Katya Berry is a 34 y.o. female who presents today for Follow-up (2 mth).    CHIEF COMPLIANT  Chief Complaint   Patient presents with    Follow-up     2 mth       Active Problems:  1.  Syncope  1.1 carotid Doppler 9/24/2024: No evidence of hemodynamically significant stenosis in either carotid system.  Antegrade flow in both vertebrals.16-49% left, less than 15% right   2.  Palpitations  2.1 cardiac monitor 8/15/2024: Comment underlying rhythm is normal sinus rhythm.  Episodes sinus tachycardia with rate up to 1:48 PM.  PVCs 5% burden.  Patient triggered symptoms of flutter/skipped beats associated with PVCs.  Trigeminy noted.  3.  Chest pain  3.1 treadmill stress test 9/24/2024: No EKG evidence of ischemia, no exercise-induced dysrhythmia.  4.  Dyspnea  4.1 echocardiogram 9/24/2024: EF 55 to 60%.  Normal LV diastolic function.  No significant valvular abnormalities.  5.  Anxiety    HPI  The patient is a 34-year-old female that returns for follow-up.  Since her last visit she has stopped taking flecainide and propranolol.  She states that these medications were making her feel very fatigued.  She has had side effects to multiple medications including sotalol and propafenone.  She has been off medications around a month.  She states that palpitation symptoms are actually stable at this time.  She states she occasionally feels a palpitation but is not overtly symptomatic with this.  She feels much better being off all medications.  She does have metoprolol tartrate to use if needed but has not had to take any of this medication.  Her EKG today shows normal sinus rhythm with no acute ST or T wave abnormalities.    PRIOR MEDS  Current Outpatient Medications on File Prior to Visit   Medication Sig Dispense Refill    aspirin 81 MG EC tablet Take 1 tablet by mouth Daily.      citalopram (CeleXA) 10 MG tablet Take 1 tablet by mouth Daily.      MAGNESIUM PO Take  by mouth. (Patient taking differently: Take   by mouth As Needed.)      SEMAGLUTIDE, 1 MG/DOSE, SC Inject 1 mg into the appropriate muscle as directed by prescriber 1 (One) Time Per Week.      [DISCONTINUED] propranolol (INDERAL) 40 MG tablet Take 1 tablet by mouth 2 (Two) Times a Day. 60 tablet 6    busPIRone (BUSPAR) 10 MG tablet Take 1 tablet by mouth Every Night. (Patient taking differently: Take 1 tablet by mouth As Needed (panic attacks).)      midodrine (PROAMATINE) 2.5 MG tablet Take 1 tablet by mouth 3 (Three) Times a Day Before Meals. (Patient taking differently: Take 1 tablet by mouth 3 (Three) Times a Day Before Meals. Takes PRN) 90 tablet 3    [DISCONTINUED] flecainide (TAMBOCOR) 50 MG tablet Take 0.5 tablets by mouth 2 (Two) Times a Day. 30 tablet 6     No current facility-administered medications on file prior to visit.       ALLERGIES  Flecainide    HISTORY  History reviewed. No pertinent past medical history.    Social History     Socioeconomic History    Marital status:    Tobacco Use    Smoking status: Never    Smokeless tobacco: Never   Vaping Use    Vaping status: Never Used   Substance and Sexual Activity    Alcohol use: Never    Drug use: Never    Sexual activity: Yes     Partners: Male     Birth control/protection: Vasectomy       Family History   Problem Relation Age of Onset    No Known Problems Mother     No Known Problems Father        Review of Systems   Constitutional:  Negative for fatigue.   Respiratory:  Positive for shortness of breath (w/ acitvity and with talking pt states its a everyday issue). Negative for chest tightness.    Cardiovascular:  Positive for palpitations (PT states their heart is beating out of their chest). Negative for chest pain and leg swelling.   Neurological:  Negative for dizziness (After stoping medication), syncope, weakness, numbness and headaches (After stoping medications).   Hematological:  Bruises/bleeds easily (Bruises).   Psychiatric/Behavioral:  Positive for sleep disturbance (Trouble  "going to sleep).        Objective     VITALS: /73 (BP Location: Left arm, Patient Position: Sitting)   Pulse 93   Ht 160 cm (63\")   Wt 69.9 kg (154 lb)   SpO2 98%   BMI 27.28 kg/m²     LABS:   Lab Results (most recent)       None            IMAGING:   No Images in the past 120 days found..    EXAM:  Physical Exam  Constitutional:       Appearance: Normal appearance.   Eyes:      Pupils: Pupils are equal, round, and reactive to light.   Cardiovascular:      Rate and Rhythm: Normal rate and regular rhythm.      Pulses:           Carotid pulses are 2+ on the right side and 2+ on the left side.       Radial pulses are 2+ on the right side and 2+ on the left side.        Dorsalis pedis pulses are 2+ on the right side and 2+ on the left side.        Posterior tibial pulses are 2+ on the right side and 2+ on the left side.      Heart sounds: Normal heart sounds.   Pulmonary:      Effort: Pulmonary effort is normal.      Breath sounds: Normal breath sounds.   Abdominal:      General: Bowel sounds are normal.      Palpations: Abdomen is soft.   Musculoskeletal:      Right lower leg: No edema.      Left lower leg: No edema.   Skin:     General: Skin is warm and dry.      Capillary Refill: Capillary refill takes less than 2 seconds.   Neurological:      General: No focal deficit present.      Mental Status: She is alert and oriented to person, place, and time.   Psychiatric:         Mood and Affect: Mood normal.         Thought Content: Thought content normal.         Procedure     ECG 12 Lead    Date/Time: 7/11/2025 8:18 AM  Performed by: Margarita Lozano APRN    Authorized by: Margarita Lozano APRN  Comparison: compared with previous ECG from 5/9/2025  Similar to previous ECG  Rhythm: sinus rhythm  Rate: normal  BPM: 76  Conduction: conduction normal  ST Segments: ST segments normal  T Waves: T waves normal  QRS axis: normal  Comments: No acute changes  Qtc 420 MS             Assessment & Plan    Diagnosis " Plan   1. Palpitations        2. PVC's (premature ventricular contractions)        3. Left carotid stenosis          Plan:  1.  Palpitations: Symptoms have come under better control.  The patient was having side effects with medications and is currently off all antiarrhythmics.  I do think moving forward it is better to keep her off medications if at all possible.  She does feel PVCs have improved and she is not having significant symptoms at this time.  She does have metoprolol tartrate to use if needed but is going to stay off beta-blockers and antiarrhythmics at this time.  She was advised to notify our office if symptoms return.  She was advised to continue to reduce caffeine and increase fluid intake.  The patient verbalizes an understanding.  2.  PVCs: Plan as above.  Return in about 6 months (around 1/11/2026).    Katya Berry  reports that she has never smoked. She has never used smokeless tobacco.               MEDS ORDERED DURING VISIT:  New Medications Ordered This Visit   Medications    metoprolol tartrate (LOPRESSOR) 25 MG tablet     Sig: Take 0.5 tablets by mouth 2 (Two) Times a Day.     Dispense:  30 tablet     Refill:  6       DISCONTINUED MEDS DURING VISIT:   Medications Discontinued During This Encounter   Medication Reason    propranolol (INDERAL) 40 MG tablet     flecainide (TAMBOCOR) 50 MG tablet           This document has been electronically signed by ANAHI De Jesus  July 11, 2025 08:49 EDT    Dictated Utilizing Dragon Dictation: Part of this note may be an electronic transcription/translation of spoken language to printed text using the Dragon Dictation System